# Patient Record
Sex: MALE | Race: WHITE | NOT HISPANIC OR LATINO | Employment: OTHER | URBAN - METROPOLITAN AREA
[De-identification: names, ages, dates, MRNs, and addresses within clinical notes are randomized per-mention and may not be internally consistent; named-entity substitution may affect disease eponyms.]

---

## 2022-05-08 ENCOUNTER — HOSPITAL ENCOUNTER (EMERGENCY)
Facility: HOSPITAL | Age: 77
End: 2022-05-08
Attending: INTERNAL MEDICINE
Payer: MEDICARE

## 2022-05-08 VITALS
DIASTOLIC BLOOD PRESSURE: 80 MMHG | WEIGHT: 275 LBS | TEMPERATURE: 99 F | RESPIRATION RATE: 28 BRPM | HEART RATE: 121 BPM | OXYGEN SATURATION: 93 % | SYSTOLIC BLOOD PRESSURE: 145 MMHG

## 2022-05-08 DIAGNOSIS — R07.9 CHEST PAIN: ICD-10-CM

## 2022-05-08 DIAGNOSIS — I50.810 RIGHT-SIDED CONGESTIVE HEART FAILURE, UNSPECIFIED HF CHRONICITY: ICD-10-CM

## 2022-05-08 DIAGNOSIS — N17.9 ACUTE RENAL FAILURE, UNSPECIFIED ACUTE RENAL FAILURE TYPE: ICD-10-CM

## 2022-05-08 DIAGNOSIS — Z99.2: ICD-10-CM

## 2022-05-08 DIAGNOSIS — T86.19: ICD-10-CM

## 2022-05-08 DIAGNOSIS — R06.02 SOB (SHORTNESS OF BREATH): Primary | ICD-10-CM

## 2022-05-08 LAB
ALBUMIN SERPL-MCNC: 3 GM/DL (ref 3.4–4.8)
ALBUMIN/GLOB SERPL: 0.7 RATIO (ref 1.1–2)
ALP SERPL-CCNC: 88 UNIT/L (ref 40–150)
ALT SERPL-CCNC: 32 UNIT/L (ref 0–55)
AST SERPL-CCNC: 30 UNIT/L (ref 5–34)
BASOPHILS # BLD AUTO: 0.1 X10(3)/MCL (ref 0–0.2)
BASOPHILS NFR BLD AUTO: 0.7 %
BILIRUBIN DIRECT+TOT PNL SERPL-MCNC: 0.2 MG/DL (ref 0–0.5)
BILIRUBIN DIRECT+TOT PNL SERPL-MCNC: 0.2 MG/DL (ref 0–0.8)
BILIRUBIN DIRECT+TOT PNL SERPL-MCNC: 0.4 MG/DL
BNP BLD-MCNC: 103.2 PG/ML
BUN SERPL-MCNC: 125 MG/DL (ref 8.4–25.7)
CALCIUM SERPL-MCNC: 9 MG/DL (ref 8.8–10)
CHLORIDE SERPL-SCNC: 101 MMOL/L (ref 98–107)
CO2 SERPL-SCNC: 15 MMOL/L (ref 23–31)
CREAT SERPL-MCNC: 10.21 MG/DL (ref 0.73–1.18)
EOSINOPHIL # BLD AUTO: 0.03 X10(3)/MCL (ref 0–0.9)
EOSINOPHIL NFR BLD AUTO: 0.2 %
ERYTHROCYTE [DISTWIDTH] IN BLOOD BY AUTOMATED COUNT: 14.8 % (ref 11.5–17)
FLUAV AG UPPER RESP QL IA.RAPID: NOT DETECTED
FLUBV AG UPPER RESP QL IA.RAPID: NOT DETECTED
GLOBULIN SER-MCNC: 4.6 GM/DL (ref 2.4–3.5)
GLUCOSE SERPL-MCNC: 113 MG/DL (ref 70–110)
GLUCOSE SERPL-MCNC: 127 MG/DL (ref 70–110)
GLUCOSE SERPL-MCNC: 72 MG/DL (ref 82–115)
GLUCOSE SERPL-MCNC: 92 MG/DL (ref 70–110)
HCT VFR BLD AUTO: 43.3 % (ref 42–52)
HGB BLD-MCNC: 14.2 GM/DL (ref 14–18)
IMM GRANULOCYTES # BLD AUTO: 0.43 X10(3)/MCL (ref 0–0.02)
IMM GRANULOCYTES NFR BLD AUTO: 3 % (ref 0–0.43)
LACTATE SERPL-SCNC: 1.8 MMOL/L (ref 0.5–2.2)
LYMPHOCYTES # BLD AUTO: 1.54 X10(3)/MCL (ref 0.6–4.6)
LYMPHOCYTES NFR BLD AUTO: 10.6 %
MCH RBC QN AUTO: 28.9 PG (ref 27–31)
MCHC RBC AUTO-ENTMCNC: 32.8 MG/DL (ref 33–36)
MCV RBC AUTO: 88 FL (ref 80–94)
MONOCYTES # BLD AUTO: 1.29 X10(3)/MCL (ref 0.1–1.3)
MONOCYTES NFR BLD AUTO: 8.9 %
NEUTROPHILS # BLD AUTO: 11.1 X10(3)/MCL (ref 2.1–9.2)
NEUTROPHILS NFR BLD AUTO: 76.6 %
PLATELET # BLD AUTO: 300 X10(3)/MCL (ref 130–400)
PMV BLD AUTO: 10.5 FL (ref 9.4–12.4)
POCT GLUCOSE: 127 MG/DL (ref 70–110)
POCT GLUCOSE: 74 MG/DL (ref 70–110)
POTASSIUM SERPL-SCNC: 6.2 MMOL/L (ref 3.5–5.1)
PROT SERPL-MCNC: 7.6 GM/DL (ref 5.8–7.6)
RBC # BLD AUTO: 4.92 X10(6)/MCL (ref 4.7–6.1)
SARS-COV-2 RNA RESP QL NAA+PROBE: NOT DETECTED
SODIUM SERPL-SCNC: 137 MMOL/L (ref 136–145)
TROPONIN I SERPL-MCNC: <0.01 NG/ML (ref 0–0.04)
WBC # SPEC AUTO: 14.5 X10(3)/MCL (ref 4.5–11.5)

## 2022-05-08 PROCEDURE — 94640 AIRWAY INHALATION TREATMENT: CPT

## 2022-05-08 PROCEDURE — 83605 ASSAY OF LACTIC ACID: CPT | Performed by: INTERNAL MEDICINE

## 2022-05-08 PROCEDURE — 82962 GLUCOSE BLOOD TEST: CPT | Mod: 91

## 2022-05-08 PROCEDURE — 96375 TX/PRO/DX INJ NEW DRUG ADDON: CPT

## 2022-05-08 PROCEDURE — 99900031 HC PATIENT EDUCATION (STAT)

## 2022-05-08 PROCEDURE — 94761 N-INVAS EAR/PLS OXIMETRY MLT: CPT | Mod: 59

## 2022-05-08 PROCEDURE — 80053 COMPREHEN METABOLIC PANEL: CPT | Performed by: INTERNAL MEDICINE

## 2022-05-08 PROCEDURE — 36415 COLL VENOUS BLD VENIPUNCTURE: CPT | Performed by: INTERNAL MEDICINE

## 2022-05-08 PROCEDURE — 84484 ASSAY OF TROPONIN QUANT: CPT | Performed by: INTERNAL MEDICINE

## 2022-05-08 PROCEDURE — 99291 CRITICAL CARE FIRST HOUR: CPT | Mod: 25

## 2022-05-08 PROCEDURE — 27000221 HC OXYGEN, UP TO 24 HOURS

## 2022-05-08 PROCEDURE — 85025 COMPLETE CBC W/AUTO DIFF WBC: CPT | Performed by: INTERNAL MEDICINE

## 2022-05-08 PROCEDURE — 63600175 PHARM REV CODE 636 W HCPCS: Performed by: INTERNAL MEDICINE

## 2022-05-08 PROCEDURE — 25000003 PHARM REV CODE 250: Performed by: INTERNAL MEDICINE

## 2022-05-08 PROCEDURE — 93005 ELECTROCARDIOGRAM TRACING: CPT

## 2022-05-08 PROCEDURE — 83880 ASSAY OF NATRIURETIC PEPTIDE: CPT | Performed by: INTERNAL MEDICINE

## 2022-05-08 PROCEDURE — 87040 BLOOD CULTURE FOR BACTERIA: CPT | Performed by: INTERNAL MEDICINE

## 2022-05-08 PROCEDURE — 87636 SARSCOV2 & INF A&B AMP PRB: CPT | Mod: CR | Performed by: INTERNAL MEDICINE

## 2022-05-08 PROCEDURE — 25000242 PHARM REV CODE 250 ALT 637 W/ HCPCS: Performed by: INTERNAL MEDICINE

## 2022-05-08 PROCEDURE — 96376 TX/PRO/DX INJ SAME DRUG ADON: CPT

## 2022-05-08 PROCEDURE — 96365 THER/PROPH/DIAG IV INF INIT: CPT

## 2022-05-08 RX ORDER — METOPROLOL SUCCINATE 25 MG/1
25 TABLET, EXTENDED RELEASE ORAL DAILY
COMMUNITY
Start: 2022-03-22

## 2022-05-08 RX ORDER — GLIPIZIDE 5 MG/1
5 TABLET, FILM COATED, EXTENDED RELEASE ORAL DAILY
COMMUNITY
Start: 2022-03-22

## 2022-05-08 RX ORDER — BUDESONIDE 0.5 MG/2ML
0.5 INHALANT ORAL ONCE
Status: COMPLETED | OUTPATIENT
Start: 2022-05-08 | End: 2022-05-08

## 2022-05-08 RX ORDER — SULFAMETHOXAZOLE AND TRIMETHOPRIM 800; 160 MG/1; MG/1
1 TABLET ORAL 2 TIMES DAILY
COMMUNITY
Start: 2022-05-03 | End: 2022-05-08

## 2022-05-08 RX ORDER — IPRATROPIUM BROMIDE AND ALBUTEROL SULFATE 2.5; .5 MG/3ML; MG/3ML
3 SOLUTION RESPIRATORY (INHALATION)
Status: COMPLETED | OUTPATIENT
Start: 2022-05-08 | End: 2022-05-08

## 2022-05-08 RX ORDER — LISINOPRIL 30 MG/1
30 TABLET ORAL DAILY
COMMUNITY
Start: 2022-04-18

## 2022-05-08 RX ORDER — FUROSEMIDE 10 MG/ML
40 INJECTION INTRAMUSCULAR; INTRAVENOUS
Status: COMPLETED | OUTPATIENT
Start: 2022-05-08 | End: 2022-05-08

## 2022-05-08 RX ORDER — CIPROFLOXACIN 500 MG/1
500 TABLET ORAL 2 TIMES DAILY
COMMUNITY
Start: 2022-04-28

## 2022-05-08 RX ORDER — HYDROCHLOROTHIAZIDE 12.5 MG/1
12.5 TABLET ORAL DAILY
COMMUNITY
Start: 2022-02-22

## 2022-05-08 RX ORDER — METFORMIN HYDROCHLORIDE 500 MG/1
1000 TABLET, EXTENDED RELEASE ORAL 2 TIMES DAILY
COMMUNITY
Start: 2022-03-22 | End: 2022-05-08

## 2022-05-08 RX ORDER — SODIUM BICARBONATE 1 MEQ/ML
50 SYRINGE (ML) INTRAVENOUS
Status: COMPLETED | OUTPATIENT
Start: 2022-05-08 | End: 2022-05-08

## 2022-05-08 RX ORDER — CALCIUM GLUCONATE 20 MG/ML
1 INJECTION, SOLUTION INTRAVENOUS
Status: COMPLETED | OUTPATIENT
Start: 2022-05-08 | End: 2022-05-08

## 2022-05-08 RX ADMIN — IPRATROPIUM BROMIDE AND ALBUTEROL SULFATE 3 ML: 2.5; .5 SOLUTION RESPIRATORY (INHALATION) at 11:05

## 2022-05-08 RX ADMIN — FUROSEMIDE 40 MG: 10 INJECTION, SOLUTION INTRAMUSCULAR; INTRAVENOUS at 03:05

## 2022-05-08 RX ADMIN — DEXTROSE MONOHYDRATE 25 G: 25 INJECTION, SOLUTION INTRAVENOUS at 12:05

## 2022-05-08 RX ADMIN — FUROSEMIDE 40 MG: 10 INJECTION, SOLUTION INTRAMUSCULAR; INTRAVENOUS at 11:05

## 2022-05-08 RX ADMIN — CALCIUM GLUCONATE 1 G: 20 INJECTION, SOLUTION INTRAVENOUS at 11:05

## 2022-05-08 RX ADMIN — SODIUM BICARBONATE 50 MEQ: 84 INJECTION, SOLUTION INTRAVENOUS at 11:05

## 2022-05-08 RX ADMIN — NITROGLYCERIN 1 INCH: 20 OINTMENT TOPICAL at 02:05

## 2022-05-08 RX ADMIN — HUMAN INSULIN 5 UNITS: 100 INJECTION, SOLUTION SUBCUTANEOUS at 12:05

## 2022-05-08 RX ADMIN — BUDESONIDE INHALATION 0.5 MG: 0.5 SUSPENSION RESPIRATORY (INHALATION) at 11:05

## 2022-05-08 NOTE — ED PROVIDER NOTES
Encounter Date: 5/8/2022  10:12 AM       History     Chief Complaint   Patient presents with    Shortness of Breath     C/o SOB that started last night. Also reports currently being treated for a UTI     HPI   Shortness of Breath (C/o SOB that started last night. Also reports currently being treated for a UTI)    Patient is brought to the emergency room by ambulance with complaint of shortness of breath which started last night, patient says everything started with the urinary tract infection, he was having UTI and then developed difficulty with micturition, then he developed Sciatica, and since last night he is having shortness of breath, NO h/o Heart failure, no history of COPD.    Review of patient's allergies indicates:  No Known Allergies  Past Medical History:   Diagnosis Date    Diabetes mellitus     Hypertension      Past Surgical History:   Procedure Laterality Date    Left arm Left      History reviewed. No pertinent family history.  Social History     Tobacco Use    Smoking status: Never Smoker    Smokeless tobacco: Never Used   Substance Use Topics    Alcohol use: Not Currently    Drug use: Never     Review of Systems   HENT: Negative for trouble swallowing and voice change.    Eyes: Negative for visual disturbance.   Respiratory: Positive for cough, chest tightness and shortness of breath.    Cardiovascular: Negative for chest pain and leg swelling.   Gastrointestinal: Negative for abdominal pain, diarrhea and vomiting.   Genitourinary: Positive for dysuria. Negative for hematuria.   Musculoskeletal: Positive for back pain. Negative for gait problem.        No Deformity   Skin: Negative for color change and rash.   Neurological: Negative for headaches.   Psychiatric/Behavioral: Negative for behavioral problems and sleep disturbance.   All other systems reviewed and are negative.      Physical Exam     Initial Vitals [05/08/22 1016]   BP Pulse Resp Temp SpO2   (!) 215/120 (!) 125 (!) 26 98.3 °F  (36.8 °C) (!) 90 %      MAP       --         Physical Exam    Nursing note and vitals reviewed.  Constitutional: He appears distressed.   Mild.   HENT:   Head: Atraumatic.   Eyes: EOM are normal.   Neck: Neck supple.   Cardiovascular: Normal heart sounds.   Pulmonary/Chest: He is in respiratory distress. He has decreased breath sounds. He has wheezes. He has rhonchi. He has rales.   Abdominal: Abdomen is soft. Bowel sounds are normal.   Musculoskeletal:         General: Normal range of motion.      Cervical back: Neck supple. No bony tenderness.     Neurological: He is alert.   Speech Normal   Skin: Skin is dry.   Psychiatric: He has a normal mood and affect.   Cooperative, Anxious         ED Course   Procedures  Labs Reviewed   COMPREHENSIVE METABOLIC PANEL - Abnormal; Notable for the following components:       Result Value    Potassium Level 6.2 (*)     Carbon Dioxide 15 (*)     Glucose Level 72 (*)     Blood Urea Nitrogen 125.0 (*)     Creatinine 10.21 (*)     Albumin Level 3.0 (*)     Globulin 4.6 (*)     Albumin/Globulin Ratio 0.7 (*)     All other components within normal limits   B-TYPE NATRIURETIC PEPTIDE - Abnormal; Notable for the following components:    Natriuretic Peptide 103.2 (*)     All other components within normal limits   CBC WITH DIFFERENTIAL - Abnormal; Notable for the following components:    WBC 14.5 (*)     MCHC 32.8 (*)     Abs Neutro 11.1 (*)     IG# 0.43 (*)     IG% 3.0 (*)     All other components within normal limits   POCT GLUCOSE, HAND-HELD DEVICE - Abnormal; Notable for the following components:    POC Glucose 113 (*)     All other components within normal limits   POCT GLUCOSE, HAND-HELD DEVICE - Abnormal; Notable for the following components:    POC Glucose 127 (*)     All other components within normal limits   POCT GLUCOSE - Abnormal; Notable for the following components:    POCT Glucose 127 (*)     All other components within normal limits   TROPONIN I - Normal   COVID/FLU A&B  PCR - Normal   LACTIC ACID, PLASMA - Normal   BLOOD CULTURE [facility]   BLOOD CULTURE [facility]   CBC W/ AUTO DIFFERENTIAL    Narrative:     The following orders were created for panel order CBC auto differential.  Procedure                               Abnormality         Status                     ---------                               -----------         ------                     CBC with Differential[[id number]]        Abnormal            Final result                 Please view results for these tests on the individual orders.   POCT GLUCOSE, HAND-HELD DEVICE   POCT GLUCOSE, HAND-HELD DEVICE   POCT GLUCOSE, HAND-HELD DEVICE   POCT GLUCOSE, HAND-HELD DEVICE   POCT GLUCOSE, HAND-HELD DEVICE   POCT GLUCOSE, HAND-HELD DEVICE   POCT GLUCOSE, HAND-HELD DEVICE   POCT GLUCOSE     Admission on 05/08/2022   Component Date Value Ref Range Status    Sodium Level 05/08/2022 137  136 - 145 mmol/L Final    Potassium Level 05/08/2022 6.2 (A) 3.5 - 5.1 mmol/L Final    Chloride 05/08/2022 101  98 - 107 mmol/L Final    Carbon Dioxide 05/08/2022 15 (A) 23 - 31 mmol/L Final    Glucose Level 05/08/2022 72 (A) 82 - 115 mg/dL Final    Blood Urea Nitrogen 05/08/2022 125.0 (A) 8.4 - 25.7 mg/dL Final    Creatinine 05/08/2022 10.21 (A) 0.73 - 1.18 mg/dL Final    Calcium Level Total 05/08/2022 9.0  8.8 - 10.0 mg/dL Final    Protein Total 05/08/2022 7.6  5.8 - 7.6 gm/dL Final    Albumin Level 05/08/2022 3.0 (A) 3.4 - 4.8 gm/dL Final    Globulin 05/08/2022 4.6 (A) 2.4 - 3.5 gm/dL Final    Albumin/Globulin Ratio 05/08/2022 0.7 (A) 1.1 - 2.0 ratio Final    Bilirubin Total 05/08/2022 0.4  <=1.5 mg/dL Final    Bilirubin Direct 05/08/2022 0.2  0.0 - 0.5 mg/dL Final    Bilirubin Indirect 05/08/2022 0.20  0.00 - 0.80 mg/dL Final    Alkaline Phosphatase 05/08/2022 88  40 - 150 unit/L Final    Alanine Aminotransferase 05/08/2022 32  0 - 55 unit/L Final    Aspartate Aminotransferase 05/08/2022 30  5 - 34 unit/L Final    Estimated GFR-Non   05/08/2022 5  mls/min/1.73/m2 Final    Troponin-I 05/08/2022 <0.010  0.000 - 0.045 ng/mL Final    Natriuretic Peptide 05/08/2022 103.2 (A) <=100.0 pg/mL Final    WBC 05/08/2022 14.5 (A) 4.5 - 11.5 x10(3)/mcL Final    RBC 05/08/2022 4.92  4.70 - 6.10 x10(6)/mcL Final    Hgb 05/08/2022 14.2  14.0 - 18.0 gm/dL Final    Hct 05/08/2022 43.3  42.0 - 52.0 % Final    MCV 05/08/2022 88.0  80.0 - 94.0 fL Final    MCH 05/08/2022 28.9  27.0 - 31.0 pg Final    MCHC 05/08/2022 32.8 (A) 33.0 - 36.0 mg/dL Final    RDW 05/08/2022 14.8  11.5 - 17.0 % Final    Platelet 05/08/2022 300  130 - 400 x10(3)/mcL Final    MPV 05/08/2022 10.5  9.4 - 12.4 fL Final    Neutro Auto 05/08/2022 76.6  % Final    Lymph Auto 05/08/2022 10.6  % Final    Mono Auto 05/08/2022 8.9  % Final    Eos Auto 05/08/2022 0.2  % Final    Basophil Auto 05/08/2022 0.7  % Final    Abs Lymph 05/08/2022 1.54  0.6 - 4.6 x10(3)/mcL Final    Abs Neutro 05/08/2022 11.1 (A) 2.1 - 9.2 x10(3)/mcL Final    Abs Mono 05/08/2022 1.29  0.1 - 1.3 x10(3)/mcL Final    Abs Eos 05/08/2022 0.03  0 - 0.9 x10(3)/mcL Final    Abs Baso 05/08/2022 0.10  0 - 0.2 x10(3)/mcL Final    IG# 05/08/2022 0.43 (A) 0 - 0.0155 x10(3)/mcL Final    IG% 05/08/2022 3.0 (A) 0 - 0.43 % Final    Influenza A PCR 05/08/2022 Not Detected  Not Detected Final    Influenza B PCR 05/08/2022 Not Detected  Not Detected Final    SARS-CoV-2 PCR 05/08/2022 Not Detected  Not Detected Final    Lactic Acid Level 05/08/2022 1.8  0.5 - 2.2 mmol/L Final    POC Glucose 05/08/2022 113 (A) 70 - 110 MG/DL Final    POC Glucose 05/08/2022 92  70 - 110 MG/DL Final    POC Glucose 05/08/2022 127 (A) 70 - 110 MG/DL Final    POCT Glucose 05/08/2022 74  70 - 110 mg/dL Final    POCT Glucose 05/08/2022 127 (A) 70 - 110 mg/dL Final     EKG Readings: (Independently Interpreted)   Initial Reading: No STEMI. Rhythm: Normal Sinus Rhythm. Heart Rate: 131. Conduction: RBBB. Clinical Impression: Sinus  Tachycardia       Imaging Results          X-Ray Chest AP Portable (Final result)  Result time 05/08/22 10:52:22    Final result by William Lynch MD (05/08/22 10:52:22)                 Impression:      Multifocal ground-glass opacities bilaterally suspicious for multifocal pneumonia.      Electronically signed by: William Lynch MD  Date:    05/08/2022  Time:    10:52             Narrative:    EXAMINATION:  Single view chest radiograph.    CLINICAL HISTORY:  Chest Pain;    TECHNIQUE:  Single view of the chest.    COMPARISON:  None.    FINDINGS:  A single AP view of the chest demonstrates multifocal ground-glass opacities bilaterally.  There is no pneumothorax.  The cardiac silhouette is normal in size.  There is no acute osseous abnormality.                                 Medications   dextrose 10% bolus 125 mL (has no administration in time range)   dextrose 10% bolus 250 mL (has no administration in time range)   albuterol-ipratropium 2.5 mg-0.5 mg/3 mL nebulizer solution 3 mL (3 mLs Nebulization Given 5/8/22 1106)   budesonide nebulizer solution 0.5 mg (0.5 mg Nebulization Given 5/8/22 1106)   calcium gluconat 1 g in NS IVPB (premixed) (0 g Intravenous Stopped 5/8/22 1257)   sodium bicarbonate 8.4 % (1 mEq/mL) injection 50 mEq (50 mEq Intravenous Given 5/8/22 1156)   insulin regular injection 5 Units (5 Units Intravenous Given 5/8/22 1215)   dextrose 50% injection 25 g (25 g Intravenous Given 5/8/22 1209)   furosemide injection 40 mg (40 mg Intravenous Given 5/8/22 1157)   nitroGLYCERIN 2% TD oint ointment 1 inch (1 inch Transdermal Given 5/8/22 1420)   furosemide injection 40 mg (40 mg Intravenous Given 5/8/22 1517)                 ED Course as of 05/08/22 1544   Sun May 08, 2022   1310 Dr. Banda called back, says get a dialysis cath done. [GQ]   1310 Dr. Her wants to call in the crew to do the procedure. [GQ]   1322 Nursing supervisor advises me that the ICU beds they had are already taken, so they can  not put him in. So I will have to transfer pt. To a place where he can be in ICU, get dialysis done, and Urology to see him. [GQ]   1414 WBC(!): 14.5 [GQ]   1415 Potassium(!): 6.2 [GQ]   1415 CO2(!): 15 [GQ]   1415 Glucose(!): 72 [GQ]   1415 BUN(!): 125.0 [GQ]   1415 Creatinine(!): 10.21  Essentially patient started with urinary tract infection, he was treated for that, then he developed back pain which he said was Sciatica, but it appears like it was more flank pain, then he started having scant urinary output, and gradually it got worse and yesterday started having shortness of breath and on arrival in the emergency room it appeared like he was fluid overloaded, the workup in the emergency room revealed that patient has acute renal failure with hyperkalemia, chest x-ray is consistent with patchy infiltrates more consistent with fluid overload, and WBC blood cell count is 14.5, lactic acid level is normal, BNP is slightly elevated, he is tachycardic, blood pressure was significantly elevated but it has come down, I gave him Lasix 40 mg IV push and I will put him on a nitro paste to do the preload, I talked to nephrologist and he wanted me to admit the patient, I did talk to the surgeon and he wanted me to call out the crew so he can put a dialysis catheter but unfortunately we do not have any ICU beds available and patient current condition does not allow me to put him on the floor for decided to transfer him to higher level of care for further management by Nephrology, Urology, Cardiology, surgery to insert dialysis catheter and treat from there.    We did put a Arias catheter in, and he is putting out some urine, he did have blood in the bladder, we had to essentially take out 1 catheter and put the 2nd catheter because of the clotting problem. [GQ]   1444 Talked to Dr. Rivera [GQ]   1445 BP(!): 145/80 [GQ]   1445 Pulse(!): 121 [GQ]   1445 Resp(!): 28 [GQ]   1445 SpO2(!): 93 % [GQ]   1445 BNP(!): 103.2 [GQ]   1445  POC Glucose: 92  Talked to Dr. Rivera at [facility] who accepted pt. Will transfer pt. To them for further management. [GQ]   1445 Patient still is tachypneic, still gargling, I will give another dose of Lasix 40 mg IV push, and he is accepted in [facility] and I will transfer him over there. [GQ]   1543 Medics are here to pick pt. To go to [facility]. [GQ]      ED Course User Index  [GQ] Timur Sullivan MD              Critical Care    Date/Time: 5/8/2022 2:25 PM  Performed by: Timur Sullivan MD  Authorized by: Timur Sullivan MD   Direct patient critical care time: 15 minutes  Additional history critical care time: 5 minutes  Ordering / reviewing critical care time: 15 minutes  Documentation critical care time: 20 minutes  Consulting other physicians critical care time: 10 minutes  Total critical care time (exclusive of procedural time) : 65 minutes  Critical care was necessary to treat or prevent imminent or life-threatening deterioration of the following conditions: cardiac failure and renal failure.  Critical care was time spent personally by me on the following activities: discussions with consultants, evaluation of patient's response to treatment, examination of patient, obtaining history from patient or surrogate, ordering and performing treatments and interventions, ordering and review of laboratory studies, ordering and review of radiographic studies, pulse oximetry and re-evaluation of patient's condition.        Clinical Impression:   Final diagnoses:  [R07.9] Chest pain  [R06.02] SOB (shortness of breath) (Primary)  [I50.810] Right-sided congestive heart failure, unspecified HF chronicity  [N17.9] Acute renal failure, unspecified acute renal failure type  [T86.19, Z99.2] Delay kidney tx func d/t ATN and fluid overload require acute dialysis          ED Disposition Condition    Transfer to Another Facility Critical              Timur Sullivan MD  05/08/22 1501       Timur HERNANDEZ  MD Nate  05/08/22 1549       Timur Sullivan MD  05/08/22 1545

## 2022-05-09 LAB
POCT GLUCOSE: 101 MG/DL (ref 70–110)
POCT GLUCOSE: 113 MG/DL (ref 70–110)
POCT GLUCOSE: 92 MG/DL (ref 70–110)

## 2022-05-13 LAB
BACTERIA BLD CULT: NORMAL
BACTERIA BLD CULT: NORMAL

## 2022-07-21 ENCOUNTER — HOSPITAL ENCOUNTER (EMERGENCY)
Facility: HOSPITAL | Age: 77
Discharge: HOME | End: 2022-07-22
Attending: STUDENT IN AN ORGANIZED HEALTH CARE EDUCATION/TRAINING PROGRAM
Payer: MEDICARE

## 2022-07-21 DIAGNOSIS — N30.01 ACUTE CYSTITIS WITH HEMATURIA: Primary | ICD-10-CM

## 2022-07-21 DIAGNOSIS — R33.9 URINARY RETENTION: ICD-10-CM

## 2022-07-21 LAB
ALBUMIN SERPL-MCNC: 3.6 GM/DL (ref 3.4–4.8)
ALBUMIN/GLOB SERPL: 1 RATIO (ref 1.1–2)
ALP SERPL-CCNC: 67 UNIT/L (ref 40–150)
ALT SERPL-CCNC: 30 UNIT/L (ref 0–55)
APPEARANCE UR: ABNORMAL
AST SERPL-CCNC: 20 UNIT/L (ref 5–34)
BACTERIA #/AREA URNS AUTO: ABNORMAL /HPF
BASOPHILS # BLD AUTO: 0.05 X10(3)/MCL (ref 0–0.2)
BASOPHILS NFR BLD AUTO: 0.4 %
BILIRUB UR QL STRIP.AUTO: NEGATIVE MG/DL
BILIRUBIN DIRECT+TOT PNL SERPL-MCNC: 0.2 MG/DL
BUN SERPL-MCNC: 23 MG/DL (ref 8.4–25.7)
CALCIUM SERPL-MCNC: 9.9 MG/DL (ref 8.8–10)
CHLORIDE SERPL-SCNC: 103 MMOL/L (ref 98–107)
CO2 SERPL-SCNC: 25 MMOL/L (ref 23–31)
COLOR UR AUTO: YELLOW
CREAT SERPL-MCNC: 1.28 MG/DL (ref 0.73–1.18)
EOSINOPHIL # BLD AUTO: 0.29 X10(3)/MCL (ref 0–0.9)
EOSINOPHIL NFR BLD AUTO: 2.2 %
ERYTHROCYTE [DISTWIDTH] IN BLOOD BY AUTOMATED COUNT: 14.2 % (ref 11.5–17)
GLOBULIN SER-MCNC: 3.7 GM/DL (ref 2.4–3.5)
GLUCOSE SERPL-MCNC: 113 MG/DL (ref 82–115)
GLUCOSE UR QL STRIP.AUTO: NEGATIVE MG/DL
HCT VFR BLD AUTO: 36 % (ref 42–52)
HGB BLD-MCNC: 11.2 GM/DL (ref 14–18)
IMM GRANULOCYTES # BLD AUTO: 0.08 X10(3)/MCL (ref 0–0.04)
IMM GRANULOCYTES NFR BLD AUTO: 0.6 %
KETONES UR QL STRIP.AUTO: NEGATIVE MG/DL
LEUKOCYTE ESTERASE UR QL STRIP.AUTO: ABNORMAL UNIT/L
LYMPHOCYTES # BLD AUTO: 3.21 X10(3)/MCL (ref 0.6–4.6)
LYMPHOCYTES NFR BLD AUTO: 23.9 %
MCH RBC QN AUTO: 28.4 PG (ref 27–31)
MCHC RBC AUTO-ENTMCNC: 31.1 MG/DL (ref 33–36)
MCV RBC AUTO: 91.4 FL (ref 80–94)
MONOCYTES # BLD AUTO: 1.57 X10(3)/MCL (ref 0.1–1.3)
MONOCYTES NFR BLD AUTO: 11.7 %
NEUTROPHILS # BLD AUTO: 8.2 X10(3)/MCL (ref 2.1–9.2)
NEUTROPHILS NFR BLD AUTO: 61.2 %
NITRITE UR QL STRIP.AUTO: POSITIVE
PH UR STRIP.AUTO: 7 [PH]
PLATELET # BLD AUTO: 356 X10(3)/MCL (ref 130–400)
PMV BLD AUTO: 9.8 FL (ref 7.4–10.4)
POTASSIUM SERPL-SCNC: 4 MMOL/L (ref 3.5–5.1)
PROT SERPL-MCNC: 7.3 GM/DL (ref 5.8–7.6)
PROT UR QL STRIP.AUTO: >=300 MG/DL
RBC # BLD AUTO: 3.94 X10(6)/MCL (ref 4.7–6.1)
RBC #/AREA URNS AUTO: ABNORMAL /HPF
RBC UR QL AUTO: ABNORMAL UNIT/L
SODIUM SERPL-SCNC: 140 MMOL/L (ref 136–145)
SP GR UR STRIP.AUTO: 1.02
SQUAMOUS #/AREA URNS AUTO: ABNORMAL /HPF
UROBILINOGEN UR STRIP-ACNC: 0.2 MG/DL
WBC # SPEC AUTO: 13.4 X10(3)/MCL (ref 4.5–11.5)
WBC #/AREA URNS AUTO: ABNORMAL /HPF

## 2022-07-21 PROCEDURE — 81001 URINALYSIS AUTO W/SCOPE: CPT | Performed by: STUDENT IN AN ORGANIZED HEALTH CARE EDUCATION/TRAINING PROGRAM

## 2022-07-21 PROCEDURE — 51798 US URINE CAPACITY MEASURE: CPT

## 2022-07-21 PROCEDURE — 85025 COMPLETE CBC W/AUTO DIFF WBC: CPT | Performed by: STUDENT IN AN ORGANIZED HEALTH CARE EDUCATION/TRAINING PROGRAM

## 2022-07-21 PROCEDURE — 36415 COLL VENOUS BLD VENIPUNCTURE: CPT | Performed by: STUDENT IN AN ORGANIZED HEALTH CARE EDUCATION/TRAINING PROGRAM

## 2022-07-21 PROCEDURE — 80053 COMPREHEN METABOLIC PANEL: CPT | Performed by: STUDENT IN AN ORGANIZED HEALTH CARE EDUCATION/TRAINING PROGRAM

## 2022-07-21 PROCEDURE — 63600175 PHARM REV CODE 636 W HCPCS: Performed by: STUDENT IN AN ORGANIZED HEALTH CARE EDUCATION/TRAINING PROGRAM

## 2022-07-21 PROCEDURE — 36000 PLACE NEEDLE IN VEIN: CPT | Mod: 59

## 2022-07-21 PROCEDURE — 25000003 PHARM REV CODE 250: Performed by: STUDENT IN AN ORGANIZED HEALTH CARE EDUCATION/TRAINING PROGRAM

## 2022-07-21 PROCEDURE — 96365 THER/PROPH/DIAG IV INF INIT: CPT

## 2022-07-21 PROCEDURE — 99285 EMERGENCY DEPT VISIT HI MDM: CPT | Mod: 25

## 2022-07-21 RX ADMIN — DEXTROSE MONOHYDRATE 1 G: 5 INJECTION INTRAVENOUS at 11:07

## 2022-07-22 VITALS
HEIGHT: 70 IN | DIASTOLIC BLOOD PRESSURE: 62 MMHG | TEMPERATURE: 99 F | WEIGHT: 255 LBS | HEART RATE: 110 BPM | SYSTOLIC BLOOD PRESSURE: 121 MMHG | OXYGEN SATURATION: 97 % | RESPIRATION RATE: 18 BRPM | BODY MASS INDEX: 36.51 KG/M2

## 2022-07-22 PROCEDURE — 36415 COLL VENOUS BLD VENIPUNCTURE: CPT | Performed by: STUDENT IN AN ORGANIZED HEALTH CARE EDUCATION/TRAINING PROGRAM

## 2022-07-22 PROCEDURE — 87040 BLOOD CULTURE FOR BACTERIA: CPT | Performed by: STUDENT IN AN ORGANIZED HEALTH CARE EDUCATION/TRAINING PROGRAM

## 2022-07-22 RX ORDER — CEFUROXIME AXETIL 500 MG/1
500 TABLET ORAL EVERY 12 HOURS
Qty: 20 TABLET | Refills: 0 | Status: SHIPPED | OUTPATIENT
Start: 2022-07-22 | End: 2022-08-01

## 2022-07-22 NOTE — ED PROVIDER NOTES
Encounter Date: 7/21/2022       History     Chief Complaint   Patient presents with    Urinary Retention     Patient had ornelas placed by urologist this AM, having issues voiding so home health nurse changed it today and still no results.      HPI     77-year-old male with a past medical history of hypertension, diabetes and BPH who presents emergency department for concerns that his Ornelas is not working correctly.  Patient states that he presented here to the emergency department on 05/08 with some shortness of breath and was found to have urinary retention and renal failure.  He states he was transferred to [place] for Urology.  States he has been having indwelling Ornelas since.  Patient states that as far as he knows his renal function has improved.  States that he is not putting out much urine and is concerned with this.  States that she only emptied his bag once today.  States that after the Ornelas was placed by the urologist today it was causing him pain so his home health nurse put a new 1 in.  States it is only drained a small amount of urine last 2 hours.  He denies any fevers or chills.  States he has generalized lower abdominal pain.  Denies having any fevers.    Review of patient's allergies indicates:  No Known Allergies  Past Medical History:   Diagnosis Date    BPH (benign prostatic hyperplasia)     Diabetes mellitus     Hypertension      Past Surgical History:   Procedure Laterality Date    Left arm Left      No family history on file.  Social History     Tobacco Use    Smoking status: Never Smoker    Smokeless tobacco: Never Used   Substance Use Topics    Alcohol use: Not Currently    Drug use: Never     Review of Systems   Constitutional: Negative for fever.   Respiratory: Negative for cough and shortness of breath.    Cardiovascular: Negative for chest pain.   Gastrointestinal: Positive for abdominal pain. Negative for constipation, diarrhea, nausea and vomiting.   Genitourinary:  Positive for difficulty urinating, dysuria and penile pain. Negative for hematuria.   All other systems reviewed and are negative.      Physical Exam     Initial Vitals [07/21/22 2049]   BP Pulse Resp Temp SpO2   (!) 164/83 (!) 115 18 98.8 °F (37.1 °C) 96 %      MAP       --         Physical Exam    Nursing note and vitals reviewed.  Constitutional: He appears well-developed.   Cardiovascular: Normal rate and regular rhythm.   Pulmonary/Chest: No respiratory distress.   Abdominal: Abdomen is soft. Bowel sounds are normal. He exhibits no distension. There is abdominal tenderness (Generalized lower abdominal tenderness).   Genitourinary:    Penis normal.      Genitourinary Comments: Indwelling Arias in place with approximately 100 cc of urine in the bag.       Neurological: He is alert and oriented to person, place, and time.   Skin: Skin is warm.   Psychiatric: He has a normal mood and affect.         ED Course   Procedures  Labs Reviewed   URINALYSIS, REFLEX TO URINE CULTURE - Abnormal; Notable for the following components:       Result Value    Appearance, UA Cloudy (*)     Protein, UA >=300 (*)     Blood, UA Large (*)     Nitrites, UA Positive (*)     Leukocyte Esterase, UA Small (*)     All other components within normal limits   COMPREHENSIVE METABOLIC PANEL - Abnormal; Notable for the following components:    Creatinine 1.28 (*)     Globulin 3.7 (*)     Albumin/Globulin Ratio 1.0 (*)     All other components within normal limits   CBC WITH DIFFERENTIAL - Abnormal; Notable for the following components:    WBC 13.4 (*)     RBC 3.94 (*)     Hgb 11.2 (*)     Hct 36.0 (*)     MCHC 31.1 (*)     Mono # 1.57 (*)     IG# 0.08 (*)     All other components within normal limits   URINALYSIS, MICROSCOPIC - Abnormal; Notable for the following components:    Bacteria, UA Few (*)     RBC, UA 50-99 (*)     WBC, UA 21-50 (*)     Squamous Epithelial Cells, UA Few (*)     All other components within normal limits   CULTURE, URINE    BLOOD CULTURE [facility]   BLOOD CULTURE [facility]   CBC W/ AUTO DIFFERENTIAL    Narrative:     The following orders were created for panel order CBC auto differential.  Procedure                               Abnormality         Status                     ---------                               -----------         ------                     CBC with Differential[[id number]]        Abnormal            Final result                 Please view results for these tests on the individual orders.          Imaging Results          CT Abdomen Pelvis  Without Contrast (Preliminary result)  Result time 07/21/22 22:23:18    Preliminary result by Eric Toledo MD (07/21/22 22:23:18)                 Narrative:    START OF REPORT:  Technique: CT of the abdomen and pelvis was performed with axial images as well as sagittal and coronal reconstruction images without intravenous contrast.    Comparison: None available.    Clinical History: Abdominal pain.    Dosage Information: Automated Exposure Control was utilized.    Findings:  Lines and Tubes: None.  Thorax:  Lungs: There is mild nonspecific dependent change at the lung bases.  Pleura: No effusions or thickening.  Heart: The heart size is within normal limits.  Abdomen:  Abdominal Wall: No abdominal wall pathology is seen.  Liver: The liver appears unremarkable.  Biliary System: No intrahepatic or extrahepatic biliary duct dilatation is seen.  Gallbladder: The gallbladder appears unremarkable.  Pancreas: The pancreas appears unremarkable.  Spleen: The spleen appears unremarkable.  Adrenals: The adrenal glands appear unremarkable.  Kidneys: The right kidney appears unremarkable with no stones cysts masses or hydronephrosis. There is an 8 mm nonobstructing stone in the lower pole calyx of the left kidney (series 3, image 73). The left kidney otherwise appears unremarkable.  Aorta: There is moderate calcification of the abdominal aorta and its branches.  IVC:  Unremarkable.  Bowel:  Esophagus: The visualized esophagus appears unremarkable.  Stomach: The stomach appears unremarkable.  Duodenum: Unremarkable appearing duodenum.  Small Bowel: The small bowel appears unremarkable.  Colon: Nondistended.  Appendix: The appendix appears unremarkable.  Peritoneum: No intraperitoneal free air or ascites is seen.    Pelvis:  Bladder: There is mild-to-moderate urinary bladder wall thickening with extensive perivesical fat stranding.  Male:  Prostate gland: The prostate gland is moderately enlarged. The balloon of the Arias catheter is seen in the prostatic urethra (series 3, image 147).    Bony structures: The bones are osteopenic. Mild degenerative changes are present in the spine.      Impression:  1. The balloon of the Arias catheter is seen in the prostatic urethra (series 3, image 147). Consider repositioning of the Arias catheter.  2. There is mild-to-moderate urinary bladder wall thickening with extensive perivesical fat stranding. This is suggestive of cystitis. Correlate with clinical and laboratory findings as regards additional evaluation and follow-up.  3. Details and other findings as discussed above.                                   Medications   cefTRIAXone (ROCEPHIN) 1 g in dextrose 5 % in water (D5W) 5 % 50 mL IVPB (MB+) (0 g Intravenous Stopped 7/22/22 0000)     Medical Decision Making:   Differential Diagnosis:   Urinary retention, catheter malfunction, dehydration, JACQUI, electrolyte abnormality             ED Course as of 07/22/22 0044   Thu Jul 21, 2022   2322 Urinary catheter was further introduced into the bladder by the nursing staff with adequate rush of urine.  Will give patient a dose of IV Rocephin secondary to urinary tract infection prior to discharge. [BS]      ED Course User Index  [BS] Ori Solorzano MD             Clinical Impression:   Final diagnoses:  [N30.01] Acute cystitis with hematuria (Primary)  [R33.9] Urinary retention          ED  Disposition Condition    Discharge Stable        ED Prescriptions     Medication Sig Dispense Start Date End Date Auth. Provider    cefUROXime (CEFTIN) 500 MG tablet Take 1 tablet (500 mg total) by mouth every 12 (twelve) hours. for 10 days 20 tablet 7/22/2022 8/1/2022 Ori Solorzano MD        Follow-up Information     Follow up With Specialties Details Why Contact Info    Randa Hurt MD Family Medicine Schedule an appointment as soon as possible for a visit   [address]  [address]  [facility] [address]  [phone]      [facility] - Emergency Dept Emergency Medicine Go to  If symptoms worsen [address] [facility] [id number] [phone]    Follow-up with urologist               Ori Solorzano MD  07/22/22 0044

## 2022-07-24 LAB — BACTERIA UR CULT: ABNORMAL

## 2022-07-27 LAB
BACTERIA BLD CULT: NORMAL
BACTERIA BLD CULT: NORMAL

## 2023-10-17 DIAGNOSIS — M54.59 WEIGHT LIFTER'S BACK: ICD-10-CM

## 2023-10-17 DIAGNOSIS — M54.59 OTHER LOW BACK PAIN: Primary | ICD-10-CM

## 2023-10-19 ENCOUNTER — HOSPITAL ENCOUNTER (OUTPATIENT)
Dept: RADIOLOGY | Facility: HOSPITAL | Age: 78
Discharge: HOME | End: 2023-10-19
Attending: FAMILY MEDICINE
Payer: MEDICARE

## 2023-10-19 DIAGNOSIS — M54.59 OTHER LOW BACK PAIN: ICD-10-CM

## 2023-10-19 PROCEDURE — 72148 MRI LUMBAR SPINE W/O DYE: CPT | Mod: TC

## 2025-03-21 ENCOUNTER — HOSPITAL ENCOUNTER (INPATIENT)
Facility: HOSPITAL | Age: 80
LOS: 1 days | Discharge: HOME | DRG: 906 | End: 2025-03-22
Attending: STUDENT IN AN ORGANIZED HEALTH CARE EDUCATION/TRAINING PROGRAM | Admitting: SURGERY
Payer: MEDICARE

## 2025-03-21 ENCOUNTER — HOSPITAL ENCOUNTER (EMERGENCY)
Facility: HOSPITAL | Age: 80
Discharge: ANOTHER HOSPITAL | End: 2025-03-21
Attending: EMERGENCY MEDICINE
Payer: MEDICARE

## 2025-03-21 VITALS
DIASTOLIC BLOOD PRESSURE: 80 MMHG | OXYGEN SATURATION: 100 % | HEART RATE: 80 BPM | BODY MASS INDEX: 38.65 KG/M2 | HEIGHT: 70 IN | WEIGHT: 270 LBS | TEMPERATURE: 98 F | SYSTOLIC BLOOD PRESSURE: 155 MMHG | RESPIRATION RATE: 18 BRPM

## 2025-03-21 DIAGNOSIS — S68.522A PARTIAL TRAUMATIC AMPUTATION OF LEFT THUMB THROUGH PHALANX, INITIAL ENCOUNTER: Primary | ICD-10-CM

## 2025-03-21 DIAGNOSIS — Z01.818 PRE-OP EVALUATION: ICD-10-CM

## 2025-03-21 DIAGNOSIS — S68.012A AMPUTATION OF LEFT THUMB, INITIAL ENCOUNTER: Primary | ICD-10-CM

## 2025-03-21 DIAGNOSIS — S62.522B OPEN DISPLACED FRACTURE OF DISTAL PHALANX OF LEFT THUMB, INITIAL ENCOUNTER: ICD-10-CM

## 2025-03-21 PROBLEM — S62.502B: Status: ACTIVE | Noted: 2025-03-21

## 2025-03-21 LAB
ABORH RETYPE: NORMAL
ANION GAP SERPL CALC-SCNC: 8 MEQ/L
APTT PPP: 28.2 SECONDS (ref 24.2–35.9)
BASOPHILS # BLD AUTO: 0.04 X10(3)/MCL
BASOPHILS NFR BLD AUTO: 0.5 %
BUN SERPL-MCNC: 29 MG/DL (ref 8.4–25.7)
CALCIUM SERPL-MCNC: 9.7 MG/DL (ref 8.8–10)
CHLORIDE SERPL-SCNC: 104 MMOL/L (ref 98–107)
CO2 SERPL-SCNC: 28 MMOL/L (ref 23–31)
CREAT SERPL-MCNC: 1.5 MG/DL (ref 0.72–1.25)
CREAT/UREA NIT SERPL: 19
EOSINOPHIL # BLD AUTO: 0.26 X10(3)/MCL (ref 0–0.9)
EOSINOPHIL NFR BLD AUTO: 3.2 %
ERYTHROCYTE [DISTWIDTH] IN BLOOD BY AUTOMATED COUNT: 15 % (ref 11.5–17)
GFR SERPLBLD CREATININE-BSD FMLA CKD-EPI: 47 ML/MIN/1.73/M2
GLUCOSE SERPL-MCNC: 139 MG/DL (ref 82–115)
GROUP & RH: NORMAL
HCT VFR BLD AUTO: 40.4 % (ref 42–52)
HGB BLD-MCNC: 12.9 G/DL (ref 14–18)
IMM GRANULOCYTES # BLD AUTO: 0.02 X10(3)/MCL (ref 0–0.04)
IMM GRANULOCYTES NFR BLD AUTO: 0.2 %
INDIRECT COOMBS: NORMAL
INR PPP: 1
LACTATE SERPL-SCNC: 1.3 MMOL/L (ref 0.5–2.2)
LYMPHOCYTES # BLD AUTO: 2.97 X10(3)/MCL (ref 0.6–4.6)
LYMPHOCYTES NFR BLD AUTO: 36.6 %
MCH RBC QN AUTO: 28.2 PG (ref 27–31)
MCHC RBC AUTO-ENTMCNC: 31.9 G/DL (ref 33–36)
MCV RBC AUTO: 88.2 FL (ref 80–94)
MONOCYTES # BLD AUTO: 1.03 X10(3)/MCL (ref 0.1–1.3)
MONOCYTES NFR BLD AUTO: 12.7 %
NEUTROPHILS # BLD AUTO: 3.79 X10(3)/MCL (ref 2.1–9.2)
NEUTROPHILS NFR BLD AUTO: 46.8 %
NRBC BLD AUTO-RTO: 0 %
PLATELET # BLD AUTO: 293 X10(3)/MCL (ref 130–400)
PMV BLD AUTO: 10.3 FL (ref 7.4–10.4)
POCT GLUCOSE: 175 MG/DL (ref 70–110)
POTASSIUM SERPL-SCNC: 4 MMOL/L (ref 3.5–5.1)
PROTHROMBIN TIME: 13.2 SECONDS (ref 12.4–14.9)
RBC # BLD AUTO: 4.58 X10(6)/MCL (ref 4.7–6.1)
SODIUM SERPL-SCNC: 140 MMOL/L (ref 136–145)
SPECIMEN OUTDATE: NORMAL
WBC # BLD AUTO: 8.11 X10(3)/MCL (ref 4.5–11.5)

## 2025-03-21 PROCEDURE — 96374 THER/PROPH/DIAG INJ IV PUSH: CPT

## 2025-03-21 PROCEDURE — 86901 BLOOD TYPING SEROLOGIC RH(D): CPT | Performed by: NURSE PRACTITIONER

## 2025-03-21 PROCEDURE — 83605 ASSAY OF LACTIC ACID: CPT | Performed by: NURSE PRACTITIONER

## 2025-03-21 PROCEDURE — 90471 IMMUNIZATION ADMIN: CPT | Performed by: EMERGENCY MEDICINE

## 2025-03-21 PROCEDURE — 85610 PROTHROMBIN TIME: CPT | Performed by: EMERGENCY MEDICINE

## 2025-03-21 PROCEDURE — 85025 COMPLETE CBC W/AUTO DIFF WBC: CPT | Performed by: EMERGENCY MEDICINE

## 2025-03-21 PROCEDURE — 26755 TREAT FINGER FRACTURE EACH: CPT | Mod: FA

## 2025-03-21 PROCEDURE — 25000003 PHARM REV CODE 250: Performed by: NURSE PRACTITIONER

## 2025-03-21 PROCEDURE — 63600175 PHARM REV CODE 636 W HCPCS

## 2025-03-21 PROCEDURE — 93010 ELECTROCARDIOGRAM REPORT: CPT | Mod: ,,, | Performed by: INTERNAL MEDICINE

## 2025-03-21 PROCEDURE — 99285 EMERGENCY DEPT VISIT HI MDM: CPT | Mod: 25,27

## 2025-03-21 PROCEDURE — 25000003 PHARM REV CODE 250

## 2025-03-21 PROCEDURE — 85730 THROMBOPLASTIN TIME PARTIAL: CPT | Performed by: EMERGENCY MEDICINE

## 2025-03-21 PROCEDURE — 90715 TDAP VACCINE 7 YRS/> IM: CPT | Performed by: EMERGENCY MEDICINE

## 2025-03-21 PROCEDURE — 99222 1ST HOSP IP/OBS MODERATE 55: CPT | Mod: AI,,, | Performed by: SURGERY

## 2025-03-21 PROCEDURE — 80048 BASIC METABOLIC PNL TOTAL CA: CPT | Performed by: EMERGENCY MEDICINE

## 2025-03-21 PROCEDURE — 0XQM0ZZ REPAIR LEFT THUMB, OPEN APPROACH: ICD-10-PCS | Performed by: ORTHOPAEDIC SURGERY

## 2025-03-21 PROCEDURE — 11000001 HC ACUTE MED/SURG PRIVATE ROOM

## 2025-03-21 PROCEDURE — 93005 ELECTROCARDIOGRAM TRACING: CPT

## 2025-03-21 PROCEDURE — 99285 EMERGENCY DEPT VISIT HI MDM: CPT | Mod: 25

## 2025-03-21 PROCEDURE — 3E0234Z INTRODUCTION OF SERUM, TOXOID AND VACCINE INTO MUSCLE, PERCUTANEOUS APPROACH: ICD-10-PCS | Performed by: ORTHOPAEDIC SURGERY

## 2025-03-21 PROCEDURE — 63600175 PHARM REV CODE 636 W HCPCS: Performed by: EMERGENCY MEDICINE

## 2025-03-21 PROCEDURE — 63600175 PHARM REV CODE 636 W HCPCS: Performed by: NURSE PRACTITIONER

## 2025-03-21 RX ORDER — DOCUSATE SODIUM 100 MG/1
100 CAPSULE, LIQUID FILLED ORAL 2 TIMES DAILY
Status: DISCONTINUED | OUTPATIENT
Start: 2025-03-21 | End: 2025-03-22 | Stop reason: HOSPADM

## 2025-03-21 RX ORDER — SODIUM CHLORIDE 9 MG/ML
INJECTION, SOLUTION INTRAVENOUS CONTINUOUS
Status: DISCONTINUED | OUTPATIENT
Start: 2025-03-21 | End: 2025-03-22 | Stop reason: HOSPADM

## 2025-03-21 RX ORDER — ENOXAPARIN SODIUM 100 MG/ML
40 INJECTION SUBCUTANEOUS EVERY 12 HOURS
Status: DISCONTINUED | OUTPATIENT
Start: 2025-03-21 | End: 2025-03-22 | Stop reason: HOSPADM

## 2025-03-21 RX ORDER — OXYCODONE HYDROCHLORIDE 10 MG/1
10 TABLET ORAL EVERY 4 HOURS PRN
Refills: 0 | Status: DISCONTINUED | OUTPATIENT
Start: 2025-03-21 | End: 2025-03-22 | Stop reason: HOSPADM

## 2025-03-21 RX ORDER — GABAPENTIN 300 MG/1
300 CAPSULE ORAL 3 TIMES DAILY
Status: DISCONTINUED | OUTPATIENT
Start: 2025-03-21 | End: 2025-03-22 | Stop reason: HOSPADM

## 2025-03-21 RX ORDER — INSULIN ASPART 100 [IU]/ML
0-5 INJECTION, SOLUTION INTRAVENOUS; SUBCUTANEOUS
Status: DISCONTINUED | OUTPATIENT
Start: 2025-03-21 | End: 2025-03-22 | Stop reason: HOSPADM

## 2025-03-21 RX ORDER — LIDOCAINE HYDROCHLORIDE 10 MG/ML
5 INJECTION, SOLUTION INFILTRATION; PERINEURAL
Status: COMPLETED | OUTPATIENT
Start: 2025-03-21 | End: 2025-03-21

## 2025-03-21 RX ORDER — ADHESIVE BANDAGE
30 BANDAGE TOPICAL DAILY PRN
Status: DISCONTINUED | OUTPATIENT
Start: 2025-03-21 | End: 2025-03-22 | Stop reason: HOSPADM

## 2025-03-21 RX ORDER — GLUCAGON 1 MG
1 KIT INJECTION
Status: DISCONTINUED | OUTPATIENT
Start: 2025-03-21 | End: 2025-03-22 | Stop reason: HOSPADM

## 2025-03-21 RX ORDER — CEFAZOLIN SODIUM 1 G/3ML
1 INJECTION, POWDER, FOR SOLUTION INTRAMUSCULAR; INTRAVENOUS
Status: COMPLETED | OUTPATIENT
Start: 2025-03-21 | End: 2025-03-21

## 2025-03-21 RX ORDER — LIDOCAINE HYDROCHLORIDE 10 MG/ML
INJECTION, SOLUTION INFILTRATION; PERINEURAL
Status: DISPENSED
Start: 2025-03-21 | End: 2025-03-22

## 2025-03-21 RX ORDER — ACETAMINOPHEN 325 MG/1
650 TABLET ORAL EVERY 4 HOURS
Status: DISCONTINUED | OUTPATIENT
Start: 2025-03-21 | End: 2025-03-22 | Stop reason: HOSPADM

## 2025-03-21 RX ORDER — METHOCARBAMOL 500 MG/1
500 TABLET, FILM COATED ORAL EVERY 8 HOURS
Status: DISCONTINUED | OUTPATIENT
Start: 2025-03-21 | End: 2025-03-22 | Stop reason: HOSPADM

## 2025-03-21 RX ORDER — MULTIVITAMIN
1 TABLET ORAL DAILY
COMMUNITY

## 2025-03-21 RX ORDER — OXYCODONE HYDROCHLORIDE 5 MG/1
5 TABLET ORAL EVERY 4 HOURS PRN
Refills: 0 | Status: DISCONTINUED | OUTPATIENT
Start: 2025-03-21 | End: 2025-03-22 | Stop reason: HOSPADM

## 2025-03-21 RX ORDER — TALC
6 POWDER (GRAM) TOPICAL NIGHTLY PRN
Status: DISCONTINUED | OUTPATIENT
Start: 2025-03-21 | End: 2025-03-22 | Stop reason: HOSPADM

## 2025-03-21 RX ORDER — CEFAZOLIN 2 G/1
2 INJECTION, POWDER, FOR SOLUTION INTRAMUSCULAR; INTRAVENOUS
Status: DISCONTINUED | OUTPATIENT
Start: 2025-03-21 | End: 2025-03-22 | Stop reason: HOSPADM

## 2025-03-21 RX ORDER — IBUPROFEN 200 MG
24 TABLET ORAL
Status: DISCONTINUED | OUTPATIENT
Start: 2025-03-21 | End: 2025-03-22 | Stop reason: HOSPADM

## 2025-03-21 RX ORDER — HYDROCODONE BITARTRATE AND ACETAMINOPHEN 5; 325 MG/1; MG/1
1 TABLET ORAL
Refills: 0 | Status: COMPLETED | OUTPATIENT
Start: 2025-03-21 | End: 2025-03-21

## 2025-03-21 RX ORDER — IBUPROFEN 200 MG
16 TABLET ORAL
Status: DISCONTINUED | OUTPATIENT
Start: 2025-03-21 | End: 2025-03-22 | Stop reason: HOSPADM

## 2025-03-21 RX ORDER — METFORMIN HYDROCHLORIDE 500 MG/1
500 TABLET ORAL 2 TIMES DAILY WITH MEALS
COMMUNITY

## 2025-03-21 RX ORDER — SILVER NITRATE 38.21; 12.74 MG/1; MG/1
1 STICK TOPICAL
Status: DISCONTINUED | OUTPATIENT
Start: 2025-03-21 | End: 2025-03-21

## 2025-03-21 RX ORDER — POLYETHYLENE GLYCOL 3350 17 G/17G
17 POWDER, FOR SOLUTION ORAL 2 TIMES DAILY
Status: DISCONTINUED | OUTPATIENT
Start: 2025-03-21 | End: 2025-03-22 | Stop reason: HOSPADM

## 2025-03-21 RX ADMIN — CEFAZOLIN 1 G: 330 INJECTION, POWDER, FOR SOLUTION INTRAMUSCULAR; INTRAVENOUS at 12:03

## 2025-03-21 RX ADMIN — TETANUS TOXOID, REDUCED DIPHTHERIA TOXOID AND ACELLULAR PERTUSSIS VACCINE, ADSORBED 0.5 ML: 5; 2.5; 8; 8; 2.5 SUSPENSION INTRAMUSCULAR at 12:03

## 2025-03-21 RX ADMIN — LIDOCAINE HYDROCHLORIDE 50 MG: 10 INJECTION, SOLUTION EPIDURAL; INFILTRATION; INTRACAUDAL; PERINEURAL at 05:03

## 2025-03-21 RX ADMIN — ACETAMINOPHEN 650 MG: 325 TABLET, FILM COATED ORAL at 10:03

## 2025-03-21 RX ADMIN — SODIUM CHLORIDE: 9 INJECTION, SOLUTION INTRAVENOUS at 08:03

## 2025-03-21 RX ADMIN — CEFAZOLIN 1 G: 330 INJECTION, POWDER, FOR SOLUTION INTRAMUSCULAR; INTRAVENOUS at 05:03

## 2025-03-21 RX ADMIN — POLYETHYLENE GLYCOL 3350 17 G: 17 POWDER, FOR SOLUTION ORAL at 10:03

## 2025-03-21 RX ADMIN — CEFAZOLIN 2 G: 2 INJECTION, POWDER, FOR SOLUTION INTRAMUSCULAR; INTRAVENOUS at 07:03

## 2025-03-21 RX ADMIN — HYDROCODONE BITARTRATE AND ACETAMINOPHEN 1 TABLET: 5; 325 TABLET ORAL at 05:03

## 2025-03-21 NOTE — ED PROVIDER NOTES
Encounter Date: 3/21/2025       History     Chief Complaint   Patient presents with    Laceration     C/o cutting left hand thumb with table saw     The patient lacerated his left thumb on a table saw just prior to coming to the ED.  He feels that the thumb is almost cut completely off.  Medical history includes diabetes and hypertension.  He is not up-to-date on his tetanus shot.        Review of patient's allergies indicates:  No Known Allergies  Past Medical History:   Diagnosis Date    BPH (benign prostatic hyperplasia)     Diabetes mellitus     Hypertension      Past Surgical History:   Procedure Laterality Date    Left arm Left      No family history on file.  Social History[1]  Review of Systems   All other systems reviewed and are negative.      Physical Exam     Initial Vitals [03/21/25 1058]   BP Pulse Resp Temp SpO2   (!) 160/80 96 18 97.7 °F (36.5 °C) 95 %      MAP       --         Physical Exam    Constitutional:   Vital signs reviewed.  Nursing note reviewed.    General:  The patient is awake and alert, appropriate and interactive.    Eyes: Conjunctiva are clear.  Corneas appear normal.  EOMI.  ENT: Face, head, external nose, and ears are normal-appearing.  The oropharynx appears normal.  The uvula is midline.  The posterior pharyngeal elements are symmetric.  Voice is normal.  Mucous membranes moist.  Neck: The neck is nontender and supple with normal range of motion.  Back: The back appears normal with full range of motion.  Respiratory: The respiratory effort is normal.  The lungs are clear to auscultation.  Cardiovascular: Heart sounds are normal.  No murmur or rub.  The rate is normal.  The rhythm is normal.  Peripheral pulses are normal and equal bilaterally.  No edema is present.  Capillary refill is less than 3 seconds.  GI: The abdomen is soft, nondistended, without mass.  There is no tenderness to palpation.  No rebound or guarding.  Bowel sounds are normal.  The liver and spleen are  nonpalpable.  Musculoskeletal: Range of motion of all joints appears normal without pain or tenderness except for the left thumb.  Left thumb:  The left thumb is nearly completely amputated at the IP joint.  There is a flap of skin and tissue that is holding thumb on.  The distal thumb portion is pink with normal color.  I reapproximated thumb in anatomical alignment and a dressing and splint was placed to maintain anatomic alignment  Skin: There is no rash.  Neurologic: No focal deficits are noted.           ED Course   Procedures  Labs Reviewed   BASIC METABOLIC PANEL - Abnormal       Result Value    Sodium 140      Potassium 4.0      Chloride 104      CO2 28      Glucose 139 (*)     Blood Urea Nitrogen 29.0 (*)     Creatinine 1.50 (*)     BUN/Creatinine Ratio 19      Calcium 9.7      Anion Gap 8.0      eGFR 47     CBC WITH DIFFERENTIAL - Abnormal    WBC 8.11      RBC 4.58 (*)     Hgb 12.9 (*)     Hct 40.4 (*)     MCV 88.2      MCH 28.2      MCHC 31.9 (*)     RDW 15.0      Platelet 293      MPV 10.3      Neut % 46.8      Lymph % 36.6      Mono % 12.7      Eos % 3.2      Basophil % 0.5      Imm Grans % 0.2      Neut # 3.79      Lymph # 2.97      Mono # 1.03      Eos # 0.26      Baso # 0.04      Imm Gran # 0.02      NRBC% 0.0     PROTIME-INR - Normal    PT 13.2      INR 1.0      Narrative:     Protimes are used to monitor anticoagulant agents such as warfarin. PT INR values are based on the current patient normal mean and the PAT value for the specific instrument reagent used.  **Routine theraputic target values for the INR are 2.0-3.0**   APTT - Normal    PTT 28.2     CBC W/ AUTO DIFFERENTIAL    Narrative:     The following orders were created for panel order CBC auto differential.  Procedure                               Abnormality         Status                     ---------                               -----------         ------                     CBC with Differential[[id number]]        Abnormal             Final result                 Please view results for these tests on the individual orders.          Imaging Results              X-Ray Finger 2 or More Views Left (Final result)  Result time 03/21/25 11:57:51      Final result by Parker Coleman MD (03/21/25 11:57:51)                   Narrative:    EXAMINATION  XR FINGER 2 OR MORE VIEWS LEFT    CLINICAL HISTORY  Thumb injury;    TECHNIQUE  A total of 3 images submitted of the left finger(s).    COMPARISON  None available at the time of initial interpretation.    FINDINGS  Overlying bandage material obscures fine osseous detail secondary to superimposed artifact.  There are notable soft tissue irregularities at the mid to distal 1st digit consistent with laceration.  Subjacent mildly displaced fracture through the base of the 1st digit distal phalanx is also evident.  There is no convincing radiopaque foreign body.    Remaining visualized osseous structures and soft tissues are without acute abnormality.  Mild regional degenerative changes are present.    IMPRESSION  First digit laceration with underlying fracture of the distal phalanx.      Electronically signed by: Parker Coleman  Date:    03/21/2025  Time:    11:57                                     Medications   ceFAZolin injection 1 g (1 g Intravenous Given 3/21/25 1209)   Tdap (BOOSTRIX) vaccine injection 0.5 mL (0.5 mLs Intramuscular Given 3/21/25 1209)     Medical Decision Making  I spoke with Dr. Wooten at 1215 and he recommended hand surgery consultation.    I reduced the thumb into anatomical alignment as best as possible.  The the thumb was splinted with a metal splint to hold it in position.  I spoke with Dr. Larson at [facility] ED at 1:55 p.m. and the patient was accepted in ER to ER transfer.  I have discussed this with the patient and his family and he is in agreement with this plan of care.  Declines ambulance transfer, states that his son who is with him we will drive him to the ER.  I  think this is acceptable as there is no real indication for use of an ambulance.  The patient assures me that he will go straight to the ED from here.    Amount and/or Complexity of Data Reviewed  Labs: ordered.  Radiology: ordered.    Risk  Prescription drug management.                                      Clinical Impression:  Final diagnoses:  [S68.012A] Amputation of left thumb, initial encounter (Primary)          ED Disposition Condition    Transfer to Another Facility Stable                    [1]   Social History  Tobacco Use    Smoking status: Never    Smokeless tobacco: Never   Substance Use Topics    Alcohol use: Not Currently    Drug use: Never        Dominic Tanner MD  03/22/25 2781

## 2025-03-21 NOTE — ED PROVIDER NOTES
Encounter Date: 3/21/2025       History     Chief Complaint   Patient presents with    Hand Pain     Pt arrives as tx from [facility] c/o L thumb partial amputation sustained this morning while working w/ table saw. [facility] workup shows L 1st digit laceration w/ underlying fracture. 1g ancef and tdap given PTA. Gauze dressing in place. No active bleeding.      See MDM    The history is provided by the patient.     Review of patient's allergies indicates:  No Known Allergies  Past Medical History:   Diagnosis Date    BPH (benign prostatic hyperplasia)     Diabetes mellitus     Hypertension      Past Surgical History:   Procedure Laterality Date    Left arm Left      No family history on file.  Social History[1]  Review of Systems   Constitutional:  Negative for chills and fever.   Respiratory:  Negative for chest tightness and shortness of breath.    Cardiovascular:  Negative for chest pain and palpitations.   Gastrointestinal:  Negative for abdominal pain, diarrhea and vomiting.   Genitourinary:  Negative for dysuria and frequency.   Musculoskeletal:         L Thumb pain   Neurological:  Negative for dizziness and syncope.   All other systems reviewed and are negative.      Physical Exam     Initial Vitals [03/21/25 1524]   BP Pulse Resp Temp SpO2   (!) 149/75 80 18 97.7 °F (36.5 °C) 95 %      MAP       --         Physical Exam    Vitals reviewed.  Constitutional: He appears well-developed and well-nourished.   Cardiovascular:  Normal rate and regular rhythm.           Pulmonary/Chest: Breath sounds normal.   Musculoskeletal:      Left hand: Deformity and laceration present. Decreased range of motion. Normal pulse.      Comments: L Distal thumb has open fracture of the distal phalanx/ partial amputation of tip of thumb. Pictures in chart.      Neurological: He is alert and oriented to person, place, and time.   Skin: Skin is warm and dry.   Psychiatric: He has a normal mood and affect.               ED  Course   Lac Repair    Date/Time: 3/21/2025 5:25 PM    Performed by: Marce Clement FNP  Authorized by: Tomi Herrmann IV, MD    Consent:     Consent obtained:  Verbal    Consent given by:  Patient    Risks, benefits, and alternatives were discussed: yes      Risks discussed:  Infection  Universal protocol:     Procedure explained and questions answered to patient or proxy's satisfaction: yes      Patient identity confirmed:  Verbally with patient  Anesthesia:     Anesthesia method:  Nerve block    Block location:  Left thumb    Block needle gauge:  25 G    Block anesthetic:  Lidocaine 1% w/o epi    Block injection procedure:  Anatomic landmarks identified    Block outcome:  Anesthesia achieved  Laceration details:     Location:  Finger    Finger location:  L thumb  Exploration:     Imaging obtained: x-ray    Comments:      Put 3 figure 8 sutures to tie off vessel that was pulsating. Was able to tie off and stop bleeding. Used 5-0 chromic gut and 1 4-0 vicryl     Labs Reviewed   LACTIC ACID, PLASMA   TYPE & SCREEN   POCT GLUCOSE MONITORING CONTINUOUS          Imaging Results    None          Medications   LIDOcaine HCL 10 mg/ml (1%) 10 mg/mL (1 %) injection (  Canceled Entry 3/21/25 1745)   enoxaparin injection 40 mg (has no administration in time range)   acetaminophen tablet 650 mg (650 mg Oral Not Given 3/21/25 1845)   oxyCODONE immediate release tablet 5 mg (has no administration in time range)   oxyCODONE immediate release tablet Tab 10 mg (has no administration in time range)   methocarbamoL tablet 500 mg (has no administration in time range)   gabapentin capsule 300 mg (has no administration in time range)   melatonin tablet 6 mg (has no administration in time range)   polyethylene glycol packet 17 g (has no administration in time range)   docusate sodium capsule 100 mg (has no administration in time range)   magnesium hydroxide 400 mg/5 ml suspension 2,400 mg (has no administration in time range)    ceFAZolin 2 g (has no administration in time range)   0.9% NaCl infusion (has no administration in time range)   glucose chewable tablet 16 g (has no administration in time range)   glucose chewable tablet 24 g (has no administration in time range)   dextrose 50% injection 12.5 g (has no administration in time range)   dextrose 50% injection 25 g (has no administration in time range)   glucagon (human recombinant) injection 1 mg (has no administration in time range)   insulin aspart U-100 injection 0-5 Units (has no administration in time range)   ceFAZolin injection 1 g (1 g Intravenous Given 3/21/25 1722)   HYDROcodone-acetaminophen 5-325 mg per tablet 1 tablet (1 tablet Oral Given 3/21/25 1722)   LIDOcaine HCL 10 mg/ml (1%) injection 5 mL (50 mg Infiltration Given by Provider 3/21/25 3125)     Medical Decision Making  80 year old male presents to ER due to sustained thumb injury while using table saw. Was seen at [place] ER and put in dressing and transferred here for hand surgery. Started on Ancef. On unwrapping dressing, there is a small bleeder in thumb. Blocked with lidocaine and attempted to tie off with sutures and re dressed. Spoke with Dr. Gant who agreed to see him in AM    Admit labs and EKG ordered.     Amount and/or Complexity of Data Reviewed  External Data Reviewed: radiology.  Radiology:  Decision-making details documented in ED Course.  Discussion of management or test interpretation with external provider(s): Spoke with Dr. Herrmann who has had face to face with patient.    Spoke with Dr. Gant who argreed to see patient in AM.    Spoke with leonel with trauma who agreed to admit.     Risk  Prescription drug management.  Decision regarding hospitalization.      Additional MDM:   Differential Diagnosis:   Other: The following diagnoses were also considered and will be evaluated: Open fracture of phalynx, thumb amputation and Thumb laceration.                                   Clinical  Impression:  Final diagnoses:  [C50.305D] Open displaced fracture of distal phalanx of left thumb, initial encounter          ED Disposition Condition    Admit                     [1]   Social History  Tobacco Use    Smoking status: Never    Smokeless tobacco: Never   Substance Use Topics    Alcohol use: Not Currently    Drug use: Never        Marce Guerrero PA-C  03/21/25 1919

## 2025-03-21 NOTE — ASSESSMENT & PLAN NOTE
Admit  Diabetic diet, NPO midnight  Sliding scale  IVF for creatinine (baseline 0.6-10)  Ancef  Ortho Consult

## 2025-03-21 NOTE — H&P
[facility]  Emergency Dept  Trauma  History & Physical    Patient Name: Bobby Romano Jr.  MRN: [id number]  Admission Date: 3/21/2025  Attending Physician: No att. providers found   Primary Care Provider: Randa Hurt MD    Patient information was obtained from patient and ER records.     Subjective:     Chief Complaint:   Chief Complaint   Patient presents with    Hand Pain     Pt arrives as tx from [facility] c/o L thumb partial amputation sustained this morning while working w/ table saw. [facility] workup shows L 1st digit laceration w/ underlying fracture. 1g ancef and tdap given PTA. Gauze dressing in place. No active bleeding.         History of Present Illness: 80M hx: DM HTN. Presents have L thumb was injured by table saw. Has partial amputation and open fracture. Bleeding controlled and pressure dressing in place.     Current Facility-Administered Medications on File Prior to Encounter   Medication    [COMPLETED] ceFAZolin injection 1 g    [COMPLETED] Tdap (BOOSTRIX) vaccine injection 0.5 mL    [DISCONTINUED] Td (Tenivac) IM vaccine (>/= 8 yo)     Current Outpatient Medications on File Prior to Encounter   Medication Sig    ciprofloxacin HCl (CIPRO) 500 MG tablet Take 500 mg by mouth 2 (two) times daily.    glipiZIDE (GLUCOTROL) 5 MG TR24 Take 5 mg by mouth once daily.    hydroCHLOROthiazide (HYDRODIURIL) 12.5 MG Tab Take 12.5 mg by mouth once daily.    lisinopriL (PRINIVIL,ZESTRIL) 30 MG tablet Take 30 mg by mouth once daily.    metoprolol succinate (TOPROL-XL) 25 MG 24 hr tablet Take 25 mg by mouth once daily.       Review of patient's allergies indicates:  No Known Allergies    Past Medical History:   Diagnosis Date    BPH (benign prostatic hyperplasia)     Diabetes mellitus     Hypertension      Past Surgical History:   Procedure Laterality Date    Left arm Left      Family History    None       Tobacco Use    Smoking status: Never    Smokeless tobacco: Never   Substance and  Sexual Activity    Alcohol use: Not Currently    Drug use: Never    Sexual activity: Not on file     Review of Systems   Constitutional:  Negative for chills and fever.   HENT:  Negative for ear pain and trouble swallowing.    Eyes:  Negative for pain and redness.   Respiratory:  Negative for cough and chest tightness.    Cardiovascular:  Negative for chest pain, palpitations and leg swelling.   Gastrointestinal:  Negative for abdominal distention, abdominal pain, nausea and vomiting.   Genitourinary:  Negative for difficulty urinating.   Musculoskeletal:  Negative for back pain and neck pain.   Skin:  Positive for wound. Negative for color change and pallor.   Neurological:  Negative for dizziness, syncope, speech difficulty, weakness, light-headedness, numbness and headaches.   Psychiatric/Behavioral:  Negative for agitation and suicidal ideas.    All other systems reviewed and are negative.    Objective:     Vital Signs (Most Recent):  Temp: 97.7 °F (36.5 °C) (03/21/25 1524)  Pulse: 80 (03/21/25 1738)  Resp: 13 (03/21/25 1738)  BP: (!) 140/53 (03/21/25 1606)  SpO2: 96 % (03/21/25 1738) Vital Signs (24h Range):  Temp:  [97.7 °F (36.5 °C)-98 °F (36.7 °C)] 97.7 °F (36.5 °C)  Pulse:  [80-96] 80  Resp:  [13-19] 13  SpO2:  [95 %-100 %] 96 %  BP: (140-160)/(53-80) 140/53     Weight: 122.5 kg (270 lb)  Body mass index is 38.74 kg/m².     Physical Exam  Constitutional:       Appearance: Normal appearance.   HENT:      Head: Normocephalic and atraumatic.      Nose: Nose normal.   Eyes:      Pupils: Pupils are equal, round, and reactive to light.   Cardiovascular:      Rate and Rhythm: Normal rate.      Pulses: Normal pulses.      Comments: Normal peripheral pulses  Pulmonary:      Effort: Pulmonary effort is normal. No respiratory distress.   Chest:      Chest wall: No tenderness.   Abdominal:      General: Abdomen is flat. Bowel sounds are normal. There is no distension.      Palpations: Abdomen is soft.      Tenderness:  There is no abdominal tenderness.   Musculoskeletal:         General: Swelling, tenderness and signs of injury present. No deformity.      Cervical back: Normal range of motion and neck supple. No tenderness.   Skin:     General: Skin is warm and dry.      Capillary Refill: Capillary refill takes less than 2 seconds.      Findings: No lesion.   Neurological:      General: No focal deficit present.      Mental Status: He is alert and oriented to person, place, and time. Mental status is at baseline.   Psychiatric:         Mood and Affect: Mood normal.         Behavior: Behavior normal.         Thought Content: Thought content normal.         Judgment: Judgment normal.                I have reviewed all pertinent lab results within the past 24 hours.    Significant Diagnostics:  I have reviewed all pertinent imaging results/findings within the past 24 hours.    Assessment/Plan:     * Open avulsion fracture of left thumb  Admit  Diabetic diet, NPO midnight  Sliding scale  IVF for creatinine (baseline 0.6-10)  Ancef  Ortho Consult      VTE Risk Mitigation (From admission, onward)           Ordered     enoxaparin injection 40 mg  Every 12 hours         03/21/25 1837     IP VTE HIGH RISK PATIENT  Once         03/21/25 1837     Place sequential compression device  Until discontinued         03/21/25 1837                    SUKH Liriano  Trauma  [facility] - Emergency Dept

## 2025-03-21 NOTE — SUBJECTIVE & OBJECTIVE
Current Facility-Administered Medications on File Prior to Encounter   Medication    [COMPLETED] ceFAZolin injection 1 g    [COMPLETED] Tdap (BOOSTRIX) vaccine injection 0.5 mL    [DISCONTINUED] Td (Tenivac) IM vaccine (>/= 6 yo)     Current Outpatient Medications on File Prior to Encounter   Medication Sig    ciprofloxacin HCl (CIPRO) 500 MG tablet Take 500 mg by mouth 2 (two) times daily.    glipiZIDE (GLUCOTROL) 5 MG TR24 Take 5 mg by mouth once daily.    hydroCHLOROthiazide (HYDRODIURIL) 12.5 MG Tab Take 12.5 mg by mouth once daily.    lisinopriL (PRINIVIL,ZESTRIL) 30 MG tablet Take 30 mg by mouth once daily.    metoprolol succinate (TOPROL-XL) 25 MG 24 hr tablet Take 25 mg by mouth once daily.       Review of patient's allergies indicates:  No Known Allergies    Past Medical History:   Diagnosis Date    BPH (benign prostatic hyperplasia)     Diabetes mellitus     Hypertension      Past Surgical History:   Procedure Laterality Date    Left arm Left      Family History    None       Tobacco Use    Smoking status: Never    Smokeless tobacco: Never   Substance and Sexual Activity    Alcohol use: Not Currently    Drug use: Never    Sexual activity: Not on file     Review of Systems   Constitutional:  Negative for chills and fever.   HENT:  Negative for ear pain and trouble swallowing.    Eyes:  Negative for pain and redness.   Respiratory:  Negative for cough and chest tightness.    Cardiovascular:  Negative for chest pain, palpitations and leg swelling.   Gastrointestinal:  Negative for abdominal distention, abdominal pain, nausea and vomiting.   Genitourinary:  Negative for difficulty urinating.   Musculoskeletal:  Negative for back pain and neck pain.   Skin:  Positive for wound. Negative for color change and pallor.   Neurological:  Negative for dizziness, syncope, speech difficulty, weakness, light-headedness, numbness and headaches.   Psychiatric/Behavioral:  Negative for agitation and suicidal ideas.    All  other systems reviewed and are negative.    Objective:     Vital Signs (Most Recent):  Temp: 97.7 °F (36.5 °C) (03/21/25 1524)  Pulse: 80 (03/21/25 1738)  Resp: 13 (03/21/25 1738)  BP: (!) 140/53 (03/21/25 1606)  SpO2: 96 % (03/21/25 1738) Vital Signs (24h Range):  Temp:  [97.7 °F (36.5 °C)-98 °F (36.7 °C)] 97.7 °F (36.5 °C)  Pulse:  [80-96] 80  Resp:  [13-19] 13  SpO2:  [95 %-100 %] 96 %  BP: (140-160)/(53-80) 140/53     Weight: 122.5 kg (270 lb)  Body mass index is 38.74 kg/m².     Physical Exam  Constitutional:       Appearance: Normal appearance.   HENT:      Head: Normocephalic and atraumatic.      Nose: Nose normal.   Eyes:      Pupils: Pupils are equal, round, and reactive to light.   Cardiovascular:      Rate and Rhythm: Normal rate.      Pulses: Normal pulses.      Comments: Normal peripheral pulses  Pulmonary:      Effort: Pulmonary effort is normal. No respiratory distress.   Chest:      Chest wall: No tenderness.   Abdominal:      General: Abdomen is flat. Bowel sounds are normal. There is no distension.      Palpations: Abdomen is soft.      Tenderness: There is no abdominal tenderness.   Musculoskeletal:         General: Swelling, tenderness and signs of injury present. No deformity.      Cervical back: Normal range of motion and neck supple. No tenderness.   Skin:     General: Skin is warm and dry.      Capillary Refill: Capillary refill takes less than 2 seconds.      Findings: No lesion.   Neurological:      General: No focal deficit present.      Mental Status: He is alert and oriented to person, place, and time. Mental status is at baseline.   Psychiatric:         Mood and Affect: Mood normal.         Behavior: Behavior normal.         Thought Content: Thought content normal.         Judgment: Judgment normal.                I have reviewed all pertinent lab results within the past 24 hours.    Significant Diagnostics:  I have reviewed all pertinent imaging results/findings within the past 24  hours.

## 2025-03-21 NOTE — PROGRESS NOTES
Pt has table saw injury to the left thumb with complex open injury. Needs revision amputation vs salvage. Discussed with ER provider and Transfer center 20min.    Admit to Trauma  NPO MN  Type nad screen  Abx    OR tomorrow if time allows    Stalin

## 2025-03-21 NOTE — HPI
80M hx: DM HTN. Presents have L thumb was injured by table saw. Has partial amputation and open fracture. Bleeding controlled and pressure dressing in place.

## 2025-03-21 NOTE — FIRST PROVIDER EVALUATION
Medical screening examination initiated.  I have conducted a focused provider triage encounter, findings are as follows:    Brief history of present illness:  80-year-old male transferred from [facility] for partial amputation of left thumb.  Sent for hand surgery evaluation    There were no vitals filed for this visit.    Pertinent physical exam:  Awake and alert, NAD    Brief workup plan:  Exam    Preliminary workup initiated; this workup will be continued and followed by the physician or advanced practice provider that is assigned to the patient when roomed.   I have re-evaluated the patient's fluid status and reviewed vital signs. Clinical perfusion assessment was performed.

## 2025-03-22 ENCOUNTER — ANESTHESIA EVENT (OUTPATIENT)
Dept: SURGERY | Facility: HOSPITAL | Age: 80
End: 2025-03-22
Payer: MEDICARE

## 2025-03-22 ENCOUNTER — ANESTHESIA (OUTPATIENT)
Dept: SURGERY | Facility: HOSPITAL | Age: 80
End: 2025-03-22
Payer: MEDICARE

## 2025-03-22 VITALS
DIASTOLIC BLOOD PRESSURE: 78 MMHG | SYSTOLIC BLOOD PRESSURE: 146 MMHG | RESPIRATION RATE: 16 BRPM | OXYGEN SATURATION: 89 % | WEIGHT: 270 LBS | HEIGHT: 70 IN | BODY MASS INDEX: 38.65 KG/M2 | HEART RATE: 121 BPM | TEMPERATURE: 98 F

## 2025-03-22 LAB
ALBUMIN SERPL-MCNC: 3.6 G/DL (ref 3.4–4.8)
ALBUMIN/GLOB SERPL: 1.2 RATIO (ref 1.1–2)
ALP SERPL-CCNC: 45 UNIT/L (ref 40–150)
ALT SERPL-CCNC: 18 UNIT/L (ref 0–55)
ANION GAP SERPL CALC-SCNC: 11 MEQ/L
AST SERPL-CCNC: 17 UNIT/L (ref 11–45)
BASOPHILS # BLD AUTO: 0.04 X10(3)/MCL
BASOPHILS NFR BLD AUTO: 0.4 %
BILIRUB SERPL-MCNC: 0.3 MG/DL
BUN SERPL-MCNC: 27.5 MG/DL (ref 8.4–25.7)
CALCIUM SERPL-MCNC: 9.4 MG/DL (ref 8.8–10)
CHLORIDE SERPL-SCNC: 105 MMOL/L (ref 98–107)
CO2 SERPL-SCNC: 25 MMOL/L (ref 23–31)
CREAT SERPL-MCNC: 1.62 MG/DL (ref 0.72–1.25)
CREAT/UREA NIT SERPL: 17
EOSINOPHIL # BLD AUTO: 0.25 X10(3)/MCL (ref 0–0.9)
EOSINOPHIL NFR BLD AUTO: 2.4 %
ERYTHROCYTE [DISTWIDTH] IN BLOOD BY AUTOMATED COUNT: 14.9 % (ref 11.5–17)
GFR SERPLBLD CREATININE-BSD FMLA CKD-EPI: 43 ML/MIN/1.73/M2
GLOBULIN SER-MCNC: 3.1 GM/DL (ref 2.4–3.5)
GLUCOSE SERPL-MCNC: 117 MG/DL (ref 82–115)
HCT VFR BLD AUTO: 36.8 % (ref 42–52)
HGB BLD-MCNC: 11.6 G/DL (ref 14–18)
IMM GRANULOCYTES # BLD AUTO: 0.04 X10(3)/MCL (ref 0–0.04)
IMM GRANULOCYTES NFR BLD AUTO: 0.4 %
LYMPHOCYTES # BLD AUTO: 3.34 X10(3)/MCL (ref 0.6–4.6)
LYMPHOCYTES NFR BLD AUTO: 32.5 %
MAGNESIUM SERPL-MCNC: 1.8 MG/DL (ref 1.6–2.6)
MCH RBC QN AUTO: 27.8 PG (ref 27–31)
MCHC RBC AUTO-ENTMCNC: 31.5 G/DL (ref 33–36)
MCV RBC AUTO: 88.2 FL (ref 80–94)
MONOCYTES # BLD AUTO: 1.23 X10(3)/MCL (ref 0.1–1.3)
MONOCYTES NFR BLD AUTO: 12 %
NEUTROPHILS # BLD AUTO: 5.38 X10(3)/MCL (ref 2.1–9.2)
NEUTROPHILS NFR BLD AUTO: 52.3 %
NRBC BLD AUTO-RTO: 0 %
OHS QRS DURATION: 128 MS
OHS QTC CALCULATION: 468 MS
PHOSPHATE SERPL-MCNC: 3.4 MG/DL (ref 2.3–4.7)
PLATELET # BLD AUTO: 277 X10(3)/MCL (ref 130–400)
PMV BLD AUTO: 10.4 FL (ref 7.4–10.4)
POCT GLUCOSE: 100 MG/DL (ref 70–110)
POCT GLUCOSE: 107 MG/DL (ref 70–110)
POCT GLUCOSE: 171 MG/DL (ref 70–110)
POTASSIUM SERPL-SCNC: 3.8 MMOL/L (ref 3.5–5.1)
PROT SERPL-MCNC: 6.7 GM/DL (ref 5.8–7.6)
RBC # BLD AUTO: 4.17 X10(6)/MCL (ref 4.7–6.1)
SODIUM SERPL-SCNC: 141 MMOL/L (ref 136–145)
WBC # BLD AUTO: 10.28 X10(3)/MCL (ref 4.5–11.5)

## 2025-03-22 PROCEDURE — 25000003 PHARM REV CODE 250: Performed by: ANESTHESIOLOGY

## 2025-03-22 PROCEDURE — 85025 COMPLETE CBC W/AUTO DIFF WBC: CPT | Performed by: NURSE PRACTITIONER

## 2025-03-22 PROCEDURE — 36000704 HC OR TIME LEV I 1ST 15 MIN: Performed by: ORTHOPAEDIC SURGERY

## 2025-03-22 PROCEDURE — 99223 1ST HOSP IP/OBS HIGH 75: CPT | Mod: 57,,, | Performed by: ORTHOPAEDIC SURGERY

## 2025-03-22 PROCEDURE — 63600175 PHARM REV CODE 636 W HCPCS: Performed by: PHYSICIAN ASSISTANT

## 2025-03-22 PROCEDURE — 0X6M0Z3 DETACHMENT AT LEFT THUMB, LOW, OPEN APPROACH: ICD-10-PCS | Performed by: ORTHOPAEDIC SURGERY

## 2025-03-22 PROCEDURE — 84100 ASSAY OF PHOSPHORUS: CPT | Performed by: NURSE PRACTITIONER

## 2025-03-22 PROCEDURE — 37000009 HC ANESTHESIA EA ADD 15 MINS: Performed by: ORTHOPAEDIC SURGERY

## 2025-03-22 PROCEDURE — 83735 ASSAY OF MAGNESIUM: CPT | Performed by: NURSE PRACTITIONER

## 2025-03-22 PROCEDURE — 25000003 PHARM REV CODE 250: Performed by: PHYSICIAN ASSISTANT

## 2025-03-22 PROCEDURE — 25000003 PHARM REV CODE 250

## 2025-03-22 PROCEDURE — 63600175 PHARM REV CODE 636 W HCPCS: Mod: JZ,TB | Performed by: ANESTHESIOLOGY

## 2025-03-22 PROCEDURE — 25000003 PHARM REV CODE 250: Performed by: NURSE PRACTITIONER

## 2025-03-22 PROCEDURE — 36000705 HC OR TIME LEV I EA ADD 15 MIN: Performed by: ORTHOPAEDIC SURGERY

## 2025-03-22 PROCEDURE — 27000221 HC OXYGEN, UP TO 24 HOURS

## 2025-03-22 PROCEDURE — 99232 SBSQ HOSP IP/OBS MODERATE 35: CPT | Mod: GC,,, | Performed by: SURGERY

## 2025-03-22 PROCEDURE — 99499 UNLISTED E&M SERVICE: CPT | Mod: AS,,, | Performed by: PHYSICIAN ASSISTANT

## 2025-03-22 PROCEDURE — 71000033 HC RECOVERY, INTIAL HOUR: Performed by: ORTHOPAEDIC SURGERY

## 2025-03-22 PROCEDURE — 63600175 PHARM REV CODE 636 W HCPCS

## 2025-03-22 PROCEDURE — 36415 COLL VENOUS BLD VENIPUNCTURE: CPT | Performed by: NURSE PRACTITIONER

## 2025-03-22 PROCEDURE — 80053 COMPREHEN METABOLIC PANEL: CPT | Performed by: NURSE PRACTITIONER

## 2025-03-22 PROCEDURE — 37000008 HC ANESTHESIA 1ST 15 MINUTES: Performed by: ORTHOPAEDIC SURGERY

## 2025-03-22 PROCEDURE — 26951 AMPUTATION OF FINGER/THUMB: CPT | Mod: LT,,, | Performed by: ORTHOPAEDIC SURGERY

## 2025-03-22 PROCEDURE — 63600175 PHARM REV CODE 636 W HCPCS: Performed by: NURSE PRACTITIONER

## 2025-03-22 RX ORDER — ONDANSETRON HYDROCHLORIDE 2 MG/ML
4 INJECTION, SOLUTION INTRAVENOUS DAILY PRN
Status: DISCONTINUED | OUTPATIENT
Start: 2025-03-22 | End: 2025-03-22 | Stop reason: HOSPADM

## 2025-03-22 RX ORDER — DEXAMETHASONE SODIUM PHOSPHATE 4 MG/ML
INJECTION, SOLUTION INTRA-ARTICULAR; INTRALESIONAL; INTRAMUSCULAR; INTRAVENOUS; SOFT TISSUE
Status: DISCONTINUED | OUTPATIENT
Start: 2025-03-22 | End: 2025-03-22

## 2025-03-22 RX ORDER — ETOMIDATE 2 MG/ML
INJECTION INTRAVENOUS
Status: DISCONTINUED | OUTPATIENT
Start: 2025-03-22 | End: 2025-03-22

## 2025-03-22 RX ORDER — LIDOCAINE HYDROCHLORIDE 10 MG/ML
1 INJECTION, SOLUTION EPIDURAL; INFILTRATION; INTRACAUDAL; PERINEURAL ONCE
Status: DISCONTINUED | OUTPATIENT
Start: 2025-03-22 | End: 2025-03-22 | Stop reason: HOSPADM

## 2025-03-22 RX ORDER — MIDAZOLAM HYDROCHLORIDE 1 MG/ML
INJECTION INTRAMUSCULAR; INTRAVENOUS
Status: DISCONTINUED | OUTPATIENT
Start: 2025-03-22 | End: 2025-03-22

## 2025-03-22 RX ORDER — PROPOFOL 10 MG/ML
VIAL (ML) INTRAVENOUS
Status: DISCONTINUED | OUTPATIENT
Start: 2025-03-22 | End: 2025-03-22

## 2025-03-22 RX ORDER — ONDANSETRON HYDROCHLORIDE 2 MG/ML
INJECTION, SOLUTION INTRAVENOUS
Status: DISCONTINUED | OUTPATIENT
Start: 2025-03-22 | End: 2025-03-22

## 2025-03-22 RX ORDER — PHENYLEPHRINE HCL IN 0.9% NACL 1 MG/10 ML
SYRINGE (ML) INTRAVENOUS
Status: DISCONTINUED | OUTPATIENT
Start: 2025-03-22 | End: 2025-03-22

## 2025-03-22 RX ORDER — GLUCAGON 1 MG
1 KIT INJECTION
Status: DISCONTINUED | OUTPATIENT
Start: 2025-03-22 | End: 2025-03-22 | Stop reason: HOSPADM

## 2025-03-22 RX ORDER — LIDOCAINE HYDROCHLORIDE 20 MG/ML
INJECTION INTRAVENOUS
Status: DISCONTINUED | OUTPATIENT
Start: 2025-03-22 | End: 2025-03-22

## 2025-03-22 RX ORDER — FAMOTIDINE 10 MG/ML
20 INJECTION, SOLUTION INTRAVENOUS ONCE
Status: DISCONTINUED | OUTPATIENT
Start: 2025-03-22 | End: 2025-03-22 | Stop reason: HOSPADM

## 2025-03-22 RX ORDER — ACETAMINOPHEN 10 MG/ML
1000 INJECTION, SOLUTION INTRAVENOUS ONCE
Status: DISCONTINUED | OUTPATIENT
Start: 2025-03-22 | End: 2025-03-22 | Stop reason: HOSPADM

## 2025-03-22 RX ORDER — FENTANYL CITRATE 50 UG/ML
INJECTION, SOLUTION INTRAMUSCULAR; INTRAVENOUS
Status: DISCONTINUED | OUTPATIENT
Start: 2025-03-22 | End: 2025-03-22

## 2025-03-22 RX ORDER — ROCURONIUM BROMIDE 10 MG/ML
INJECTION, SOLUTION INTRAVENOUS
Status: DISCONTINUED | OUTPATIENT
Start: 2025-03-22 | End: 2025-03-22

## 2025-03-22 RX ORDER — CEPHALEXIN 500 MG/1
500 CAPSULE ORAL EVERY 8 HOURS
Qty: 42 CAPSULE | Refills: 0 | Status: SHIPPED | OUTPATIENT
Start: 2025-03-22 | End: 2025-04-05

## 2025-03-22 RX ORDER — CEFAZOLIN SODIUM 1 G/3ML
INJECTION, POWDER, FOR SOLUTION INTRAMUSCULAR; INTRAVENOUS
Status: DISCONTINUED | OUTPATIENT
Start: 2025-03-22 | End: 2025-03-22

## 2025-03-22 RX ORDER — GABAPENTIN 300 MG/1
300 CAPSULE ORAL ONCE
Status: DISCONTINUED | OUTPATIENT
Start: 2025-03-22 | End: 2025-03-22 | Stop reason: HOSPADM

## 2025-03-22 RX ORDER — HYDROMORPHONE HYDROCHLORIDE 2 MG/ML
0.4 INJECTION, SOLUTION INTRAMUSCULAR; INTRAVENOUS; SUBCUTANEOUS EVERY 5 MIN PRN
Status: DISCONTINUED | OUTPATIENT
Start: 2025-03-22 | End: 2025-03-22 | Stop reason: HOSPADM

## 2025-03-22 RX ORDER — MORPHINE SULFATE 4 MG/ML
4 INJECTION, SOLUTION INTRAMUSCULAR; INTRAVENOUS EVERY 6 HOURS PRN
Refills: 0 | Status: DISCONTINUED | OUTPATIENT
Start: 2025-03-22 | End: 2025-03-22 | Stop reason: HOSPADM

## 2025-03-22 RX ORDER — SODIUM CHLORIDE 0.9 % (FLUSH) 0.9 %
10 SYRINGE (ML) INJECTION
Status: DISCONTINUED | OUTPATIENT
Start: 2025-03-22 | End: 2025-03-22 | Stop reason: HOSPADM

## 2025-03-22 RX ORDER — HYDROCODONE BITARTRATE AND ACETAMINOPHEN 5; 325 MG/1; MG/1
1 TABLET ORAL EVERY 4 HOURS PRN
Qty: 42 TABLET | Refills: 0 | Status: SHIPPED | OUTPATIENT
Start: 2025-03-22 | End: 2025-03-29

## 2025-03-22 RX ORDER — METOPROLOL SUCCINATE 25 MG/1
25 TABLET, EXTENDED RELEASE ORAL ONCE
Status: COMPLETED | OUTPATIENT
Start: 2025-03-22 | End: 2025-03-22

## 2025-03-22 RX ADMIN — DOCUSATE SODIUM 100 MG: 100 CAPSULE, LIQUID FILLED ORAL at 09:03

## 2025-03-22 RX ADMIN — Medication 150 MCG: at 08:03

## 2025-03-22 RX ADMIN — METHOCARBAMOL 500 MG: 500 TABLET ORAL at 02:03

## 2025-03-22 RX ADMIN — ACETAMINOPHEN 650 MG: 325 TABLET, FILM COATED ORAL at 05:03

## 2025-03-22 RX ADMIN — ACETAMINOPHEN 650 MG: 325 TABLET, FILM COATED ORAL at 02:03

## 2025-03-22 RX ADMIN — Medication 100 MCG: at 08:03

## 2025-03-22 RX ADMIN — ACETAMINOPHEN 650 MG: 325 TABLET, FILM COATED ORAL at 03:03

## 2025-03-22 RX ADMIN — FENTANYL CITRATE 50 MCG: 50 INJECTION, SOLUTION INTRAMUSCULAR; INTRAVENOUS at 07:03

## 2025-03-22 RX ADMIN — SODIUM CHLORIDE, SODIUM GLUCONATE, SODIUM ACETATE, POTASSIUM CHLORIDE AND MAGNESIUM CHLORIDE: 526; 502; 368; 37; 30 INJECTION, SOLUTION INTRAVENOUS at 07:03

## 2025-03-22 RX ADMIN — MORPHINE SULFATE 4 MG: 4 INJECTION INTRAVENOUS at 12:03

## 2025-03-22 RX ADMIN — SUGAMMADEX 200 MG: 100 INJECTION, SOLUTION INTRAVENOUS at 08:03

## 2025-03-22 RX ADMIN — LIDOCAINE HYDROCHLORIDE 80 MG: 20 INJECTION INTRAVENOUS at 07:03

## 2025-03-22 RX ADMIN — HYDROMORPHONE HYDROCHLORIDE 0.4 MG: 2 INJECTION INTRAMUSCULAR; INTRAVENOUS; SUBCUTANEOUS at 09:03

## 2025-03-22 RX ADMIN — OXYCODONE HYDROCHLORIDE 10 MG: 10 TABLET ORAL at 09:03

## 2025-03-22 RX ADMIN — GABAPENTIN 300 MG: 300 CAPSULE ORAL at 02:03

## 2025-03-22 RX ADMIN — METOPROLOL SUCCINATE 25 MG: 25 TABLET, EXTENDED RELEASE ORAL at 07:03

## 2025-03-22 RX ADMIN — PROPOFOL 90 MG: 10 INJECTION, EMULSION INTRAVENOUS at 07:03

## 2025-03-22 RX ADMIN — FENTANYL CITRATE 50 MCG: 50 INJECTION, SOLUTION INTRAMUSCULAR; INTRAVENOUS at 08:03

## 2025-03-22 RX ADMIN — DEXAMETHASONE SODIUM PHOSPHATE 4 MG: 4 INJECTION, SOLUTION INTRA-ARTICULAR; INTRALESIONAL; INTRAMUSCULAR; INTRAVENOUS; SOFT TISSUE at 08:03

## 2025-03-22 RX ADMIN — ACETAMINOPHEN 650 MG: 325 TABLET, FILM COATED ORAL at 09:03

## 2025-03-22 RX ADMIN — ETOMIDATE 12 MG: 2 INJECTION INTRAVENOUS at 07:03

## 2025-03-22 RX ADMIN — CEFAZOLIN 2 G: 2 INJECTION, POWDER, FOR SOLUTION INTRAMUSCULAR; INTRAVENOUS at 11:03

## 2025-03-22 RX ADMIN — HYDROMORPHONE HYDROCHLORIDE 0.4 MG: 2 INJECTION INTRAMUSCULAR; INTRAVENOUS; SUBCUTANEOUS at 08:03

## 2025-03-22 RX ADMIN — MIDAZOLAM HYDROCHLORIDE 2 MG: 1 INJECTION, SOLUTION INTRAMUSCULAR; INTRAVENOUS at 07:03

## 2025-03-22 RX ADMIN — ENOXAPARIN SODIUM 40 MG: 40 INJECTION SUBCUTANEOUS at 09:03

## 2025-03-22 RX ADMIN — ROCURONIUM BROMIDE 40 MG: 10 SOLUTION INTRAVENOUS at 07:03

## 2025-03-22 RX ADMIN — SODIUM CHLORIDE 500 ML: 0.9 INJECTION, SOLUTION INTRAVENOUS at 11:03

## 2025-03-22 RX ADMIN — CEFAZOLIN 2 G: 2 INJECTION, POWDER, FOR SOLUTION INTRAMUSCULAR; INTRAVENOUS at 03:03

## 2025-03-22 RX ADMIN — POLYETHYLENE GLYCOL 3350 17 G: 17 POWDER, FOR SOLUTION ORAL at 09:03

## 2025-03-22 RX ADMIN — ONDANSETRON 4 MG: 2 INJECTION INTRAMUSCULAR; INTRAVENOUS at 08:03

## 2025-03-22 RX ADMIN — CEFAZOLIN 2 G: 330 INJECTION, POWDER, FOR SOLUTION INTRAMUSCULAR; INTRAVENOUS at 08:03

## 2025-03-22 NOTE — ANESTHESIA PREPROCEDURE EVALUATION
03/22/2025  Bobby Romano Jr. is a 80 y.o., male.      Pre-op Assessment    I have reviewed the Patient Summary Reports.     I have reviewed the Nursing Notes. I have reviewed the NPO Status.   I have reviewed the Medications.     Review of Systems  Anesthesia Hx:  No problems with previous Anesthesia                Social:  Non-Smoker       Cardiovascular:     Hypertension   Denies MI.        Denies Angina.  Denies CHF.                             Hypertension         Pulmonary:    Denies COPD.  Denies Asthma.                    Renal/:  Chronic Renal Disease, ARF  BPH Hx ARF 2022 - resolved             Hepatic/GI:      Denies GERD. Denies Liver Disease.  Denies Hepatitis.              Neurological:    Denies CVA.    Denies Seizures.                                Endocrine:  Diabetes, well controlled, type 2 Denies Hypothyroidism.  Denies Hyperthyroidism.  Diabetes                    Obesity / BMI > 30      Physical Exam  General: Well nourished, Cooperative, Alert and Oriented    Airway:  Mallampati: I   Mouth Opening: Normal  TM Distance: Normal  Tongue: Normal  Neck ROM: Normal ROM    Dental:  Intact        Anesthesia Plan  Type of Anesthesia, risks & benefits discussed:    Anesthesia Type: Gen ETT  Intra-op Monitoring Plan: Standard ASA Monitors  Induction:  IV  Airway Plan: Video  Informed Consent: Informed consent signed with the Patient and all parties understand the risks and agree with anesthesia plan.  All questions answered.   ASA Score: 3  Day of Surgery Review of History & Physical: H&P Update referred to the surgeon/provider.  Anesthesia Plan Notes: ETT - obesity  Das    Ready For Surgery From Anesthesia Perspective.     .

## 2025-03-22 NOTE — TRANSFER OF CARE
"Anesthesia Transfer of Care Note    Patient: Bobby Romano Jr.    Procedure(s) Performed: Procedure(s) (LRB):  INCISION AND DRAINAGE, UPPER EXTREMITY (Left)    Patient location: PACU    Anesthesia Type: general    Transport from OR: Transported from OR on room air with adequate spontaneous ventilation. Upon arrival to PACU/ICU, patient attached to 100% O2 by T-piece with adequate spontaneous ventilation    Post pain: adequate analgesia    Post assessment: no apparent anesthetic complications    Post vital signs: stable    Level of consciousness: sedated and responds to stimulation    Nausea/Vomiting: no nausea/vomiting    Complications: none    Transfer of care protocol was followed      Last vitals: Visit Vitals  /60   Pulse 97   Temp 36 °C (96.8 °F)   Resp (!) 22   Ht 5' 10" (1.778 m)   Wt 122.5 kg (270 lb)   SpO2 (!) 91%   BMI 38.74 kg/m²     "

## 2025-03-22 NOTE — OP NOTE
OPERATIVE REPORT      Patient: Bobby Romano Jr.   : 1945    MRN: [id number]  Date: 2025      Surgeon:Irineo Gant DO  Assistant: Claudy Ramey was essential, part of the procedure including deep hardware placement and deep closure.  No senior assistant was availible  Preoperative Diagnosis:  Left thumb table saw injury  Postoperative Diagnosis: Same  Procedure:    Revision amputation left thumb at the level of the inter phalangeal joint-CPT 80191  Anesthesiologist: Agustin George DO  OR Staff:   Implants: * No implants in log *  EBL:20cc  Complications: None  Disposition: To PACU, stable    Indications: Bobby Romano Jr. is a 80 y.o. male presenting with the aforementioned injuries/findings. The procedure is indicated to decrease risk of infection.  The patient is awake and alert. After thorough discussion of the risks, benefits, expected outcomes, and alternatives to surgical intervention, the patient agreed to proceed with surgical treatment.  Specific risks discussed included, but were not limited to: superficial or deep infection, wound healing complications, DVT/PE, significant bleeding requiring transfusion, damage to named anatomic structures in the immediate area including named neurovascular structures, infection, nonunion, malunion and general risks of anesthesia.  Major risk of iatrogenic fracture and return of arthrofibrosis.  The patient voiced understanding and written as well as verbal consent was obtained by myself prior to the procedure.    Patient has a table saw injury to the left distal aspect of the thumb with bone loss.  He understands risks and benefits with the partial thumb amputation including possible need for further soft tissue treatment and revision amputation in the future    Procedure Note:  The patient was brought back to the OR and placed supine on the OR table. After successful induction of anesthesia by anesthesia staff, the patient was positioned  in the supine position and all bony prominences were padded appropriately.  The surgical field was then provisionally cleansed and then prepped and draped in the usual sterile fashion.    At this time a time-out was performed, with the correct patient, site, and procedure identified.  The universal time out as well as sign your site protocols were followed.  Preoperative antibiotics were verified as administered.     My attention is drawn to the left thumb.  Patient has a large defect in the sterile and germinal matrix with bone loss of the proximal 50% of the distal phalanx.  We then completed a revised amputation excising the sterile matrix in ablating the germinal matrix to decrease remnant nail growth.  We then created healthy skin flaps washed the thumb approach appropriately completed a digital nerve block with a 0.25% Marcaine without and completed a closure    The incision(s) was/were then irrigated using copious sterile saline and then vancomyocin was added to the wound bed for prophylaxis. The surgical wound was closed in layered fashion.  The surgical site(s) was/were were sterilely cleansed and dressed.    The patient was then subsequently transferred to to PACU in a stable condition.    All sponge and needle counts were correct at the end of the case.  I was present and participated in all aspects of the procedure.    Prognosis:  The patient will be kept NWB ROMAT 6 weeks.  Patient will receive oral Keflex for 72 hours, Okay for DC.  Patient will receive DVT prophylaxis . Patient may need return to the OR for excisional debridement or washout if infection/skin necrosis is observed.      This note/OR report was created with the assistance of  voice recognition software or phone  dictation.  There may be transcription errors as a result of using this technology however minimal. Effort has been made to assure accuracy of transcription but any obvious errors or omissions should be clarified with the author  of the document.       Irineo Gant, DO  Orthopedic Trauma Surgery

## 2025-03-22 NOTE — NURSING
Patient was given discharge instructions,home medications and follow up appointment information. He was discharged to home in stable condition with all questions answered. Follow up appointment to be scheduled on Monday 3/24.

## 2025-03-22 NOTE — DISCHARGE SUMMARY
[facility] - Periop Services  Discharge Note  Short Stay    Procedure(s) (LRB):  INCISION AND DRAINAGE, UPPER EXTREMITY (Left)      OUTCOME: Patient tolerated treatment/procedure well without complication and is now ready for discharge.    DISPOSITION: Home or Self Care    FINAL DIAGNOSIS:  Open avulsion fracture of left thumb    FOLLOWUP: In clinic    DISCHARGE INSTRUCTIONS:    Discharge Procedure Orders   Notify your health care provider if you experience any of the following:  temperature >100.4     Notify your health care provider if you experience any of the following:  persistent nausea and vomiting or diarrhea     Notify your health care provider if you experience any of the following:  severe uncontrolled pain     Notify your health care provider if you experience any of the following:  redness, tenderness, or signs of infection (pain, swelling, redness, odor or green/yellow discharge around incision site)      Remove dressing in 72 hours   Order Comments: Begin daily dry dressing changes POD 3. May cover with soft dressing or large band aid. Keep clean and dry. No showers to POD 7. Do not submerge. Do not apply ointments or creams.     Weight bearing restrictions (specify):   Order Comments: JANAE HOWARD         Medication List        START taking these medications      cephALEXin 500 MG capsule  Commonly known as: KEFLEX  Take 1 capsule (500 mg total) by mouth every 8 (eight) hours. for 14 days     HYDROcodone-acetaminophen 5-325 mg per tablet  Commonly known as: NORCO  Take 1 tablet by mouth every 4 (four) hours as needed for Pain.            CONTINUE taking these medications      glipiZIDE 5 MG Tr24     hydroCHLOROthiazide 12.5 MG Tab     lisinopriL 30 MG tablet  Commonly known as: PRINIVIL,ZESTRIL     metFORMIN 500 MG tablet  Commonly known as: GLUCOPHAGE     metoprolol succinate 25 MG 24 hr tablet  Commonly known as: TOPROL-XL     multivitamin per tablet  Commonly known as: THERAGRAN             STOP taking these medications      ciprofloxacin HCl 500 MG tablet  Commonly known as: CIPRO               Where to Get Your Medications        These medications were sent to [facility] - [place], [place] - [address]  [address], [facility] [address]      Phone: [phone]   cephALEXin 500 MG capsule  HYDROcodone-acetaminophen 5-325 mg per tablet          TIME SPENT ON DISCHARGE: 15 minutes

## 2025-03-22 NOTE — ANESTHESIA PROCEDURE NOTES
Intubation    Date/Time: 3/22/2025 8:01 AM    Performed by: Angel Holt CRNA  Authorized by: Agustin George DO    Intubation:     Induction:  Intravenous    Intubated:  Postinduction    Mask Ventilation:  Easy mask    Attempts:  1    Attempted By:  CRNA    Method of Intubation:  Direct    Blade:  Maciel 2    Laryngeal View Grade: Grade I - full view of cords      Difficult Airway Encountered?: No      Complications:  None    Airway Device:  Oral endotracheal tube    Airway Device Size:  7.5    Style/Cuff Inflation:  Cuffed (inflated to minimal occlusive pressure)    Inflation Amount (mL):  7    Tube secured:  23    Secured at:  The lips    Placement Verified By:  Capnometry    Complicating Factors:  None    Findings Post-Intubation:  BS equal bilateral and atraumatic/condition of teeth unchanged

## 2025-03-22 NOTE — PROGRESS NOTES
Pt Hx and procedure discussed in detail. Post op orders reviewed. Pt attached to v/s machine and vitals assessed. Procedure site and IV sites assessed and intact. Pre-op symptoms compared to post op symptoms. Family at bedside. Everyone understands pt info with no further questions.

## 2025-03-22 NOTE — CONSULTS
[facility] - 8th Floor Med Surg  Orthopedic Trauma  Consult Note    Patient Name: Bobby Romano Jr.  MRN: [id number]  Admission Date: 3/21/2025  Hospital Length of Stay: 1 days  Attending Provider: No att. providers found  Primary Care Provider: Randa Hurt MD        Inpatient consult to Orthopedics  Consult performed by: Irineo Gant DO  Consult ordered by: Marce Guerrero PA-C        Subjective:         Chief Complaint:   Chief Complaint   Patient presents with    Hand Pain     Pt arrives as tx from [facility] c/o L thumb partial amputation sustained this morning while working w/ table saw. [facility] workup shows L 1st digit laceration w/ underlying fracture. 1g ancef and tdap given PTA. Gauze dressing in place. No active bleeding.         HPI:  Patient is a 80-year-old gentleman with a table saw injury to the left thumb.  Transferred here from a trauma center for evaluation by Orthopedics in the hand.  Patient has a partial amputation of the left thumb he has been NPO over midnight.    Past Medical History:   Diagnosis Date    BPH (benign prostatic hyperplasia)     Diabetes mellitus     Hypertension        Past Surgical History:   Procedure Laterality Date    Left arm Left        Review of patient's allergies indicates:  No Known Allergies    Current Facility-Administered Medications   Medication    0.9% NaCl infusion    acetaminophen tablet 650 mg    ceFAZolin 2 g    dextrose 50% injection 12.5 g    dextrose 50% injection 25 g    docusate sodium capsule 100 mg    enoxaparin injection 40 mg    gabapentin capsule 300 mg    glucagon (human recombinant) injection 1 mg    glucose chewable tablet 16 g    glucose chewable tablet 24 g    insulin aspart U-100 injection 0-5 Units    magnesium hydroxide 400 mg/5 ml suspension 2,400 mg    melatonin tablet 6 mg    methocarbamoL tablet 500 mg    oxyCODONE immediate release tablet 5 mg    oxyCODONE immediate release tablet Tab 10 mg     "polyethylene glycol packet 17 g     Family History    None       Tobacco Use    Smoking status: Never    Smokeless tobacco: Never   Substance and Sexual Activity    Alcohol use: Not Currently    Drug use: Never    Sexual activity: Not on file       ROS:  Constitutional: Denies fever chills  Eyes: No change in vision  ENT: No ringing or current infections  CV: No chest pain  Resp: No labored breathing  MSK: Pain evident at site of injury located in HPI,   Integ: No signs of abrasions or lacerations  Neuro: No numbness or tingling  Lymphatic: No swelling outside the area of injury   Objective:     Vital Signs (Most Recent):  Temp: 97.8 °F (36.6 °C) (03/22/25 0337)  Pulse: 88 (03/22/25 0337)  Resp: 18 (03/22/25 0302)  BP: (!) 136/53 (03/22/25 0337)  SpO2: 95 % (03/22/25 0337) Vital Signs (24h Range):  Temp:  [97.7 °F (36.5 °C)-98.6 °F (37 °C)] 97.8 °F (36.6 °C)  Pulse:  [80-96] 88  Resp:  [13-20] 18  SpO2:  [94 %-100 %] 95 %  BP: ()/(53-80) 136/53     Weight: 122.5 kg (270 lb)  Height: 5' 10" (177.8 cm)  Body mass index is 38.74 kg/m².    No intake or output data in the 24 hours ending 03/22/25 0718    Ortho/SPM Exam  General the patient is alert and oriented x3 no acute distress nontoxic-appearing appropriate affect.    Constitutional: Vital signs are examined and stable.  Resp: No signs of labored breathing    LUE: --Skin:  Immediate pictures in chart showing a large soft tissue injury of the distal aspect of the left thumb         -MSK: STR 5/5 AIN/PIN/Median/Radial/Ulnar motor           -Neuro:  Sensation intact to light touch C5-T1 dermatomes           -Lymphatic: No signs of lymphadenopathy, No signs of swelling,           -CV:Capillary refill is less than 2 seconds. Radial and ulnar pulses 2/4. Compartments Soft and compressible                                      Significant Labs:  I have reviewed all labs in relation to Orthopedics  Recent Lab Results  (Last 5 results in the past 72 hours)        " 03/22/25  0551   03/22/25  0433   03/21/25  2200   03/21/25 2034 03/21/25 1954        Albumin/Globulin Ratio   1.2             ABO and RH       O POS         Albumin   3.6             ALP   45             ALT   18             Anion Gap   11.0             AST   17             Baso #   0.04             Basophil %   0.4             BILIRUBIN TOTAL   0.3             BUN   27.5             BUN/CREAT RATIO   17             Calcium   9.4             Chloride   105             CO2   25             Creatinine   1.62             eGFR   43  Comment: Estimated GFR calculated using the CKD-EPI creatinine (2021) equation.             Eos #   0.25             Eos %   2.4             Globulin, Total   3.1             Glucose   117             Group & Rh         O POS       Hematocrit   36.8             Hemoglobin   11.6             Immature Grans (Abs)   0.04             Immature Granulocytes   0.4             Indirect Samia GEL         NEG       Lactic Acid Level         1.3       Lymph #   3.34             LYMPH %   32.5             Magnesium    1.80             MCH   27.8             MCHC   31.5             MCV   88.2             Mono #   1.23             Mono %   12.0             MPV   10.4             Neut #   5.38             Neut %   52.3             nRBC   0.0             Phosphorus Level   3.4             Platelet Count   277             POCT Glucose 100     175           Potassium   3.8             PROTEIN TOTAL   6.7             RBC   4.17             RDW   14.9             Sodium   141             Specimen Outdate         03/24/2025 23:59       WBC   10.28                                    Significant Imaging: I have reviewed all pertinent imaging results/findings.  X-Ray Finger 2 or More Views Left  Result Date: 3/21/2025  EXAMINATION XR FINGER 2 OR MORE VIEWS LEFT CLINICAL HISTORY Thumb injury; TECHNIQUE A total of 3 images submitted of the left finger(s). COMPARISON None available at the time of initial  interpretation. FINDINGS Overlying bandage material obscures fine osseous detail secondary to superimposed artifact.  There are notable soft tissue irregularities at the mid to distal 1st digit consistent with laceration.  Subjacent mildly displaced fracture through the base of the 1st digit distal phalanx is also evident.  There is no convincing radiopaque foreign body. Remaining visualized osseous structures and soft tissues are without acute abnormality.  Mild regional degenerative changes are present. IMPRESSION First digit laceration with underlying fracture of the distal phalanx. Electronically signed by: Parker Coleman Date:    03/21/2025 Time:    11:57       Assessment/Plan:     Active Diagnoses:    Diagnosis Date Noted POA    PRINCIPAL PROBLEM:  Open avulsion fracture of left thumb [S62.502B] 03/21/2025 Yes      Problems Resolved During this Admission:       Independent Radiology ordered by other provider:   Two views left thumb showing distal phalanx fracture with soft tissue injury    Pt has acute injury with risk of severe bodily function with their injury to the left thumb.     -Risks included with this type of injury to the thumb including amputation    Image Results reviewed from prior visits     Patient is a transfer from an outside trauma center for a crush injury to left thumb table saw injury.  Patient needs revision amputation.  Apparently they do not have hand coverage or board-certified orthopedic surgeon on-call.  We will take the patient today for washout and revision amputation risks and benefits explained to the patient.    I explained that surgery and the nature of their condition are not without risks. These include, but are not limited to, bleeding, infection, neurovascular compromise, malunion, nonunion, hardware complications, wound complications, scarring, cosmetic defects, need for later and/or repeated surgeries, avascular necrosis, bone death due to initial trauma, pain, loss of ROM,  loss of function, PTOA, deformity, stance/gait and/or functional abnormalities, thromboembolic complications, compartment syndrome, loss of limb, loss of life, anesthetic complications, and other imponderables. I explained that these can occur despite the adequacy of treatments rendered, and that their risks are heightened given the nature of their condition.  I have also discussed the importance not using nicotine products due to the increased risk of infection surgical wound healing complications and nonunion of the fracture.  They verbalized understanding.  No guarantees were made.  They would like to continue with surgery at this time. If appropriate family was involved with surgical discussion.         This note/OR report was created with the assistance of  voice recognition software or phone  dictation.  There may be transcription errors as a result of using this technology however minimal. Effort has been made to assure accuracy of transcription but any obvious errors or omissions should be clarified with the author of the document.       Irineo Gant,    Orthopedic Trauma Surgery  [facility] - 8th Floor Med Surg

## 2025-03-22 NOTE — PROGRESS NOTES
TERTIARY TRAUMA SURVEY (TTS)    List Injuries Identified to Date:   1. Complex open L first digit injury    [x]Problems list reviewed  List Operations and Procedures:   1. Revision amputation left thumb at level of interphalangela joint    Past Surgical History:   Procedure Laterality Date    Left arm Left        Incidental findings:   1. None    Past Medical History:     Active Ambulatory Problems     Diagnosis Date Noted    No Active Ambulatory Problems     Resolved Ambulatory Problems     Diagnosis Date Noted    No Resolved Ambulatory Problems     Past Medical History:   Diagnosis Date    BPH (benign prostatic hyperplasia)     Diabetes mellitus     Hypertension      Past Medical History:   Diagnosis Date    BPH (benign prostatic hyperplasia)     Diabetes mellitus     Hypertension        Tertiary Physical Exam:     Physical Exam  Constitutional:       Appearance: Normal appearance.   HENT:      Head: Normocephalic and atraumatic.      Mouth/Throat:      Mouth: Mucous membranes are moist.   Eyes:      Extraocular Movements: Extraocular movements intact.      Conjunctiva/sclera: Conjunctivae normal.      Pupils: Pupils are equal, round, and reactive to light.   Cardiovascular:      Rate and Rhythm: Normal rate and regular rhythm.      Pulses: Normal pulses.   Pulmonary:      Effort: Pulmonary effort is normal.   Abdominal:      General: Abdomen is flat.      Palpations: Abdomen is soft.   Musculoskeletal:         General: Normal range of motion.      Left hand: Laceration present. Normal pulse.      Cervical back: Normal range of motion.   Skin:     General: Skin is warm and dry.      Capillary Refill: Capillary refill takes less than 2 seconds.   Neurological:      General: No focal deficit present.      Mental Status: He is alert and oriented to person, place, and time.   Psychiatric:         Mood and Affect: Mood normal.         Imaging Review:     Imaging Results    None          Lab Review:   CBC:  Recent  "Labs   Lab Result Units 03/21/25  1143 03/22/25  0433   WBC x10(3)/mcL 8.11 10.28   RBC x10(6)/mcL 4.58* 4.17*   Hgb g/dL 12.9* 11.6*   Hct % 40.4* 36.8*   Platelet x10(3)/mcL 293 277   MCV fL 88.2 88.2   MCH pg 28.2 27.8   MCHC g/dL 31.9* 31.5*       CMP:  Recent Labs   Lab Result Units 03/22/25  0433   Calcium mg/dL 9.4   Albumin g/dL 3.6   Sodium mmol/L 141   Potassium mmol/L 3.8   CO2 mmol/L 25   Chloride mmol/L 105   Blood Urea Nitrogen mg/dL 27.5*   Creatinine mg/dL 1.62*   ALP unit/L 45   ALT unit/L 18   AST unit/L 17   Bilirubin Total mg/dL 0.3       Troponin:  No results for input(s): "TROPONINI" in the last 2160 hours.    ETOH:  No results for input(s): "ETHANOL" in the last 72 hours.     Urine Drug Screen:  No results for input(s): "COCAINE", "OPIATE", "BARBITURATE", "AMPHETAMINE", "FENTANYL", "CANNABINOIDS", "MDMA" in the last 72 hours.    Invalid input(s): "BENZODIAZEPINE", "PHENCYCLIDINE"     Plan:     OR today with ortho  - Consults: Orthopedic surgery  - Diabetic diet  - mIVF   - Daily labs  - MM pain control  - IS  - No indication for therapy  - Prophylactic Lovenox 40mg BID   - Precautions: Standard  - Disposition: Home  - Outpatient follow up: Orthopedic surgery    Discharge post operatively.     Mello Gu M.D.   [facility] General Surgery - PGY1     "

## 2025-03-22 NOTE — ANESTHESIA POSTPROCEDURE EVALUATION
Anesthesia Post Evaluation    Patient: Bobby Romano Jr.    Procedure(s) Performed: Procedure(s) (LRB):  INCISION AND DRAINAGE, UPPER EXTREMITY (Left)    Final Anesthesia Type: general      Patient location during evaluation: PACU  Patient participation: Yes- Able to Participate  Level of consciousness: awake and alert  Post-procedure vital signs: reviewed and stable  Pain management: adequate  Airway patency: patent    PONV status at discharge: No PONV  Anesthetic complications: no      Cardiovascular status: blood pressure returned to baseline  Respiratory status: unassisted and spontaneous ventilation  Hydration status: euvolemic  Follow-up needed               Vitals Value Taken Time   /76 03/22/25 09:22   Temp 36.4 °C (97.6 °F) 03/22/25 09:20   Pulse 98 03/22/25 09:30   Resp 18 03/22/25 09:30   SpO2 98 % 03/22/25 09:30   Vitals shown include unfiled device data.      Event Time   Out of Recovery 03/22/2025 09:28:36         Pain/Brenton Score: Pain Rating Prior to Med Admin: 8 (3/22/2025  9:05 AM)  Brenton Score: 9 (3/22/2025  9:29 AM)

## 2025-03-25 ENCOUNTER — PATIENT OUTREACH (OUTPATIENT)
Dept: ADMINISTRATIVE | Facility: CLINIC | Age: 80
End: 2025-03-25
Payer: MEDICARE

## 2025-03-25 NOTE — PROGRESS NOTES
C3 nurse spoke with Bobby Romano Jr. for a TCC post hospital discharge follow up call. The patient does not have a scheduled HOSFU appointment with Randa Hurt MD within 5-7 days post hospital discharge date 3/22/25. C3 nurse was unable to schedule HOSFU appointment in Epic or route message to PCP.  Patient advised to call and schedule appt.  Patient also advised to call Dr. Gant's office (ortho) to schedule appt if he does not receive a call from the office by tomorrow.  Patient voiced understanding and has contact information to call.

## 2025-04-16 ENCOUNTER — OFFICE VISIT (OUTPATIENT)
Dept: ORTHOPEDICS | Facility: CLINIC | Age: 80
End: 2025-04-16
Payer: MEDICARE

## 2025-04-16 VITALS — SYSTOLIC BLOOD PRESSURE: 136 MMHG | DIASTOLIC BLOOD PRESSURE: 64 MMHG | HEART RATE: 97 BPM

## 2025-04-16 DIAGNOSIS — S62.502B OPEN AVULSION FRACTURE OF PHALANX OF LEFT THUMB, INITIAL ENCOUNTER: Primary | ICD-10-CM

## 2025-04-16 PROCEDURE — 99024 POSTOP FOLLOW-UP VISIT: CPT | Mod: POP,,, | Performed by: ORTHOPAEDIC SURGERY
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
## 2025-05-14 ENCOUNTER — OFFICE VISIT (OUTPATIENT)
Dept: ORTHOPEDICS | Facility: CLINIC | Age: 80
End: 2025-05-14
Payer: MEDICARE

## 2025-05-14 VITALS
HEART RATE: 89 BPM | SYSTOLIC BLOOD PRESSURE: 116 MMHG | DIASTOLIC BLOOD PRESSURE: 61 MMHG | BODY MASS INDEX: 38.66 KG/M2 | WEIGHT: 270.06 LBS | HEIGHT: 70 IN

## 2025-05-14 DIAGNOSIS — S62.502B OPEN AVULSION FRACTURE OF PHALANX OF LEFT THUMB, INITIAL ENCOUNTER: Primary | ICD-10-CM

## 2025-05-14 PROCEDURE — 99024 POSTOP FOLLOW-UP VISIT: CPT | Mod: POP,,, | Performed by: ORTHOPAEDIC SURGERY

## 2025-05-14 NOTE — PROGRESS NOTES
"Subjective:       Patient ID: Bobby Romano Jr. is a 80 y.o. male.  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  He is doing very well.  Back to his normal activity.  No complaints no fevers no chills no new numbness or tingling changes in sensation         ROS:  Constitutional: Denies fever chills  Eyes: No change in vision  ENT: No ringing or current infections  CV: No chest pain  Resp: No labored breathing  MSK: Pain evident at site of injury located in HPI,   Integ: No signs of abrasions or lacerations  Neuro: No numbness or tingling  Lymphatic: No swelling outside the area of injury     Medications Ordered Prior to Encounter[1]       Objective:      /61   Pulse 89   Ht 5' 10" (1.778 m)   Wt 122.5 kg (270 lb 1 oz)   BMI 38.75 kg/m²   General the patient is alert and oriented x3 no acute distress nontoxic-appearing appropriate affect.    Constitutional: Vital signs are examined and stable.  Resp: No signs of labored breathing    LUE: -Skin:  Completely healed no signs of infection           -MSK: STR 5/5 AIN/PIN/Median/Radial/Ulnar motor,           -Neuro:  Sensation intact to light touch C5-T1 dermatomes           -Lymphatic: No signs of  lymphadenopathy,            -CV:  Capillary refill is less than 2 seconds. Radial and ulnar pulses 2/4. Compartments soft and compressible     Body mass index is 38.75 kg/m².  Ideal body weight: 73 kg (160 lb 15 oz)  Adjusted ideal body weight: 92.8 kg (204 lb 9.4 oz)  No results found for: "HGBA1C"  Hgb   Date Value Ref Range Status   03/22/2025 11.6 (L) 14.0 - 18.0 g/dL Final   03/21/2025 12.9 (L) 14.0 - 18.0 g/dL Final     No results found for: "[id number]"  WBC   Date Value Ref " Range Status   03/22/2025 10.28 4.50 - 11.50 x10(3)/mcL Final   03/21/2025 8.11 4.50 - 11.50 x10(3)/mcL Final       Radiology: no XR        Assessment:         1. Open avulsion fracture of phalanx of left thumb, initial encounter                  Plan:         No follow-ups on file.    There are no diagnoses linked to this encounter.  Assessment & Plan             Patient is doing great today.  A states he is he is back to all of his normal activities.  He is completely healed.  No numbness no tingling.  No increased sensitivity no signs of neuroma.  Patient will follow up as needed        This note/OR report was created with the assistance of  voice recognition software or phone  dictation.  There may be transcription errors as a result of using this technology however minimal. Effort has been made to assure accuracy of transcription but any obvious errors or omissions should be clarified with the author of the document.     This note was generated with the assistance of ambient listening technology. Verbal consent was obtained by the patient and accompanying visitor(s) for the recording of patient appointment to facilitate this note. I attest to having reviewed and edited the generated note for accuracy, though some syntax or spelling errors may persist. Please contact the author of this note for any clarification.       Irineo Gant DO  Orthopedic Trauma Surgery  05/14/2025      No future appointments.                    [1]   Current Outpatient Medications on File Prior to Visit   Medication Sig Dispense Refill    glipiZIDE (GLUCOTROL) 5 MG TR24 Take 5 mg by mouth once daily.      hydroCHLOROthiazide (HYDRODIURIL) 12.5 MG Tab Take 12.5 mg by mouth once daily.      lisinopriL (PRINIVIL,ZESTRIL) 30 MG tablet Take 30 mg by mouth once daily.      metFORMIN (GLUCOPHAGE) 500 MG tablet Take 500 mg by mouth 2 (two) times daily with meals.      metoprolol succinate (TOPROL-XL) 25 MG 24 hr tablet Take 25 mg by mouth once  daily.      multivitamin (THERAGRAN) per tablet Take 1 tablet by mouth once daily.       No current facility-administered medications on file prior to visit.

## (undated) DEVICE — DRAPE STERI U-SHAPED 47X51IN

## (undated) DEVICE — DRAPE HAND STERILE

## (undated) DEVICE — PADDING 4X4YD SPECIALIST100

## (undated) DEVICE — TAPE SILK 3IN

## (undated) DEVICE — Device

## (undated) DEVICE — GLOVE PROTEXIS NEU-THERA SZ6

## (undated) DEVICE — GLOVE SIGNATURE MICRO LTX 6

## (undated) DEVICE — GLOVE PROTEXIS BLUE LATEX 9

## (undated) DEVICE — ELECTRODE REM POLYHESIVE II

## (undated) DEVICE — DRESSING XEROFORM 5X9IN

## (undated) DEVICE — IRRIGATION SET Y-TYPE TUR/BLAD

## (undated) DEVICE — APPLICATOR CHLORAPREP ORN 26ML

## (undated) DEVICE — COVER FULLGUARD SHOE HIGH-TOP

## (undated) DEVICE — GLOVE PROTEXIS HYDROGEL SZ9

## (undated) DEVICE — PAD CAST 6X4YD SPECIALISTIC

## (undated) DEVICE — GOWN SMARTGOWN 3XL XLONG